# Patient Record
Sex: MALE | Race: WHITE | URBAN - METROPOLITAN AREA
[De-identification: names, ages, dates, MRNs, and addresses within clinical notes are randomized per-mention and may not be internally consistent; named-entity substitution may affect disease eponyms.]

---

## 2017-09-19 ENCOUNTER — HOSPITAL ENCOUNTER (INPATIENT)
Facility: CLINIC | Age: 32
LOS: 2 days | Discharge: HOME | DRG: 885 | End: 2017-09-22
Attending: FAMILY MEDICINE | Admitting: PSYCHIATRY & NEUROLOGY
Payer: COMMERCIAL

## 2017-09-19 DIAGNOSIS — F31.12 BIPOLAR 1 DISORDER, MANIC, MODERATE (H): ICD-10-CM

## 2017-09-19 DIAGNOSIS — R45.1 AGITATION REQUIRING SEDATION PROTOCOL: ICD-10-CM

## 2017-09-19 DIAGNOSIS — F30.9 BIPOLAR I DISORDER, SINGLE MANIC EPISODE (H): ICD-10-CM

## 2017-09-19 DIAGNOSIS — R45.1 AGITATED: ICD-10-CM

## 2017-09-19 DIAGNOSIS — F91.9 DISRUPTIVE BEHAVIOR DISORDER: ICD-10-CM

## 2017-09-19 DIAGNOSIS — F22 PARANOID STATE, SIMPLE (H): ICD-10-CM

## 2017-09-19 DIAGNOSIS — F41.1 GENERALIZED ANXIETY DISORDER: Primary | ICD-10-CM

## 2017-09-19 DIAGNOSIS — F30.10 MANIC BEHAVIOR (H): ICD-10-CM

## 2017-09-19 DIAGNOSIS — R46.89 AGGRESSIVE BEHAVIOR: ICD-10-CM

## 2017-09-19 DIAGNOSIS — F22 PARANOIA (H): ICD-10-CM

## 2017-09-19 DIAGNOSIS — Z72.0 TOBACCO ABUSE: ICD-10-CM

## 2017-09-19 LAB
ALCOHOL BREATH TEST: 0.03 (ref 0–0.01)
AMPHETAMINES UR QL SCN: NEGATIVE
BARBITURATES UR QL: NEGATIVE
BENZODIAZ UR QL: NEGATIVE
CANNABINOIDS UR QL SCN: POSITIVE
COCAINE UR QL: NEGATIVE
ETHANOL UR QL SCN: POSITIVE
OPIATES UR QL SCN: NEGATIVE

## 2017-09-19 PROCEDURE — 90791 PSYCH DIAGNOSTIC EVALUATION: CPT

## 2017-09-19 PROCEDURE — 96372 THER/PROPH/DIAG INJ SC/IM: CPT | Performed by: FAMILY MEDICINE

## 2017-09-19 PROCEDURE — 25000125 ZZHC RX 250: Performed by: FAMILY MEDICINE

## 2017-09-19 PROCEDURE — S0166 INJ OLANZAPINE 2.5MG: HCPCS | Performed by: FAMILY MEDICINE

## 2017-09-19 PROCEDURE — 82075 ASSAY OF BREATH ETHANOL: CPT | Performed by: FAMILY MEDICINE

## 2017-09-19 PROCEDURE — 80320 DRUG SCREEN QUANTALCOHOLS: CPT | Performed by: FAMILY MEDICINE

## 2017-09-19 PROCEDURE — 80307 DRUG TEST PRSMV CHEM ANLYZR: CPT | Performed by: FAMILY MEDICINE

## 2017-09-19 PROCEDURE — 99285 EMERGENCY DEPT VISIT HI MDM: CPT | Mod: Z6 | Performed by: FAMILY MEDICINE

## 2017-09-19 PROCEDURE — 99285 EMERGENCY DEPT VISIT HI MDM: CPT | Mod: 25 | Performed by: FAMILY MEDICINE

## 2017-09-19 RX ORDER — BUSPIRONE HYDROCHLORIDE 15 MG/1
TABLET ORAL
Status: ON HOLD | COMMUNITY
End: 2017-09-22

## 2017-09-19 RX ORDER — OLANZAPINE 10 MG/2ML
10 INJECTION, POWDER, FOR SOLUTION INTRAMUSCULAR
Status: COMPLETED | OUTPATIENT
Start: 2017-09-19 | End: 2017-09-19

## 2017-09-19 RX ADMIN — OLANZAPINE 10 MG: 10 INJECTION, POWDER, LYOPHILIZED, FOR SOLUTION INTRAMUSCULAR at 12:10

## 2017-09-19 ASSESSMENT — ENCOUNTER SYMPTOMS
FEVER: 0
SLEEP DISTURBANCE: 1
CONFUSION: 0
HALLUCINATIONS: 0
AGITATION: 1
ACTIVITY CHANGE: 1
ABDOMINAL PAIN: 0
DYSPHORIC MOOD: 0
SHORTNESS OF BREATH: 0
HYPERACTIVE: 1

## 2017-09-19 NOTE — ED NOTES
Bed: HW04  Expected date: 9/19/17  Expected time: 11:19 AM  Means of arrival: Ambulance  Comments:  N 712  31y M combative, hold, security to meet in [facility] [place]

## 2017-09-19 NOTE — ED NOTES
Police called numerous times . Recent dx of schizophrenia. Uncooperative. trashed his apartment.

## 2017-09-19 NOTE — PHARMACY-ADMISSION MEDICATION HISTORY
"Admission Medication History status for the 9/19/2017 admission is complete.  See EPIC admission navigator for Prior to Admission medications.    Medication history sources:  Patient, [facility] ([phone])     Medication history source reliability: Moderate. Patient knew he was on anxiety medication but did not know name. Medication was clarified with [facility] then again with patient to be buspirone.     Medication adherence:  Good. Patient reports \"this\" is the first time he is taking medications but he is able to take his buspirone daily.     Changes made to PTA medication list (reason)  Added:   -buspirone 15mg tab - per patient/pharmacy.  Deleted: None  Changed: None    Additional medication history information (including reliability of information, actions taken by pharmacist):   -Per [facility] pharmacy - patient picked up #45 tabs of buspirone on 9/12/17.   -Patient reports he takes 1 tablet of buspirone in the morning and 1/2 to 1 tablet in the evening depending on how he feels (prescription written for 1/2 tablet in the evening). Recently, patient took 1 tablet in the morning and 1 tablet in the evening.   -Patient previously picked up lorazepam which patient reported helped with anxiety but he ran out of pills.   Per [facility] pharmacy:  8/14/17 : Picked up lorazepam 0.5mg- 1T PO Q6H PRN for #14 tabs.  8/7/17: Picked up lorazepam 1mg - 1T PO QD PRN for #10 tabs.  7/27/17: Picked up lorazepam 1mg - 1T PO BID PRN for #12 tabs.  -Per [facility], Patient previously on sertraline 25mg - 1 tablet by mouth once daily picked up 7/27/17 for #30 tabs. Patient reported he stopped this medication because it made him very sick.  -Patient confirmed no known allergies.     Time spent in this activity: 20 minutes    Medication history completed by: MARCELLO Aranda3 Pharmacy Intern    Prior to Admission medications    Medication Sig Last Dose Taking? Auth Provider   busPIRone (BUSPAR) 15 MG tablet Take one tablet by mouth in the " morning and a half tablet by mouth in the evening. 9/19/2017 at AM Yes Unknown, Entered By History

## 2017-09-19 NOTE — ED NOTES
"Pt yelling, swearing at staff, making threats to PA and RN; \"You'll see what goes around comes around.\"    "

## 2017-09-19 NOTE — ED NOTES
Arrived in restraints accompanied by police. Patient is threatening staff,not cooperative. Not willing to follow directions. Patient had trashed and thrown things off his apt balcony. Swearing loudly refusing to cooperate.

## 2017-09-19 NOTE — ED NOTES
Alert but sleepy. Calm and cooperative  Verbalizes understanding of need to remain calm and cooperative when restraints removed,  agreeable with conditions for restraint removal.  restraints removed, given water, then sleeping calmly in bed

## 2017-09-19 NOTE — ED NOTES
"RN going through triage questions, pt yelling at RN, not answering all questions instead responding \"Are you?\"  Pt asking when he can leave and why he was given meds, pt states \"You should've just rolled me a blunt then I would've been good.  What do you think I'm gonna do, run out of here?\"  "

## 2017-09-19 NOTE — ED NOTES
Pt continues to struggle against restraints, complaining of pain in lower back and wrist-RN and MD notified.

## 2017-09-19 NOTE — ED PROVIDER NOTES
In person face to face assessment completed, including an evaluation of the patient's immediate reaction to the intervention, complete review of systems assessment, behavioral assessment and review/assessment of history, drugs and medications, recent labs, etc., and behavioral condition.  The patient experienced: No adverse physical outcome from seclusion/restraint initiation.  The intervention of restraint or seclusion needs to terminate.     Alec Montana MD  09/19/17 8449

## 2017-09-19 NOTE — ED PROVIDER NOTES
"  History     Chief Complaint   Patient presents with     Aggressive Behavior     brought in by police swearing yelling,out of control     HPI  Brodie Carnes is a 31 year old male with a medical history notable for schizophrenia and NURY who presents to the ED via EMS after being taken in by police due to throwing things from his apartment balcony, exposing himself, and making violent threats. He became upset today after he realized that he has spent over one hundred thousand dollars on rent over the past 8 years at his apartment, and they had \"never fixed anything.\" This prompted his aforementioned behavior. The patient's father was contacted, and the patient hasn't been sleeping much for the past 2 to 3 months per his report. He also has a family history of bipolar disorder, though as noted above, no diagnosis of such. The patient reports that he is jobless, as he was fired for not being able to get along with his coworkers, and his unemployment pay has run out recently. He reports that he was prescribed a new medication for NURY a couple weeks ago, but he cannot remember the name of the med nor the doctor who prescribed it. 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 He denies any SI or HI.  Other history noted per police they have been out to his place 6 times in last 2 days.  They do not feel he is safe.  Patient is been somewhat increasing paranoid thoughts is been taunting neighbors also.  Father agrees patient is currently not stable and is been off medications since July.    PAST MEDICAL HISTORY:   Past Medical History:   Diagnosis Date     Schizophrenia (H)        PAST SURGICAL HISTORY: No past surgical history on file.    FAMILY HISTORY: No family history on file.    SOCIAL HISTORY:   Social History   Substance Use Topics     Smoking status: Not on file     Smokeless tobacco: Not on file "     Alcohol use Not on file       Patient's Medications    No medications on file        Not on File      I have reviewed the Medications, Allergies, Past Medical and Surgical History, and Social History in the Epic system.    Review of Systems   Unable to perform ROS: Psychiatric disorder   Constitutional: Positive for activity change. Negative for fever.   Respiratory: Negative for shortness of breath.    Cardiovascular: Negative for chest pain.   Gastrointestinal: Negative for abdominal pain.   Psychiatric/Behavioral: Positive for agitation, behavioral problems and sleep disturbance (Not sleeping). Negative for confusion, dysphoric mood, hallucinations, self-injury and suicidal ideas. The patient is hyperactive.    All other systems reviewed and are negative.      Physical Exam      Physical Exam   Constitutional: He appears well-developed and well-nourished. He appears distressed.   Patient agitated here requiring a code 21 patient placed initially in 5 point restraints.   HENT:   Head: Normocephalic and atraumatic.   Eyes: EOM are normal. Pupils are equal, round, and reactive to light. No scleral icterus.   Neck: Normal range of motion. Neck supple.   Cardiovascular: Regular rhythm.    Pulmonary/Chest: No stridor. No respiratory distress.   Abdominal: There is no guarding.   Musculoskeletal: He exhibits no deformity.   Neurological: He is alert. He has normal reflexes.   Nonfocal   Skin: Skin is warm and dry. No rash noted. He is not diaphoretic. No erythema. No pallor.   Psychiatric:   Patient agitated here in the ER uncooperative.   Nursing note and vitals reviewed.      ED Course     ED Course     Procedures         Patient evaluated in ER.  As noted 5 point restraints were placed initially.  Patient given Zyprexa 10 mg IM also.  Patient did not have any adverse reactions to medication or restraints he was removed from restraints is been more cooperative and reassessed in the ER.    Patient states he wants  to go home he focuses on spending over $100,000 and rent over last 8 years and is nothing to show for it.     was here and interviewed the patient received more collateral information from police along with father were both concerned about patient's well-being I think at this point patient off medications seems somewhat manic.  Increasing agitated behavior etc. is concerning at this point feel patient needs to be hospitalized.  Patient will not be admitted voluntarily.  After discussing with police and father information given him being off medications place the patient on a 72 hr hold and plan to admit for further mental health evaluation.    Patient's breathalyzers 0.031 urine tox screen positive for THC and alcohol.    Critical Care time:  none             Labs Ordered and Resulted from Time of ED Arrival Up to the Time of Departure from the ED   DRUG ABUSE SCREEN 6 CHEM DEP URINE ([facility]) - Abnormal; Notable for the following:        Result Value    Cannabinoids Qual Urine Positive (*)     Ethanol Qual Urine Positive (*)     All other components within normal limits   ALCOHOL BREATH TEST POCT - Abnormal; Notable for the following:     Alcohol Breath Test 0.031 (*)     All other components within normal limits     Results for orders placed or performed during the hospital encounter of 09/19/17   Drug abuse screen 6 urine (tox)   Result Value Ref Range    Amphetamine Qual Urine Negative NEG^Negative    Barbiturates Qual Urine Negative NEG^Negative    Benzodiazepine Qual Urine Negative NEG^Negative    Cannabinoids Qual Urine Positive (A) NEG^Negative    Cocaine Qual Urine Negative NEG^Negative    Ethanol Qual Urine Positive (A) NEG^Negative    Opiates Qualitative Urine Negative NEG^Negative   Alcohol breath test POCT   Result Value Ref Range    Alcohol Breath Test 0.031 (A) 0.00 - 0.01            Assessments & Plan (with Medical Decision Making)  31-year-old male presents by ambulance increasing agitated  behavior.  Patient history of mental illness family history of bipolar disorder patient had been on medications for what was presumed to be schizophrenia but has been off since July of this year.  Per father history patient is not been sleeping more agitated increasing paranoid thoughts are noted.  Police report 6 times out the patient's apartment which she's been evicted from in the last 2 days.  Patient throwing things off the balcony is been taunting neighbors patient exposing himself to others increasing paranoia aggressive behavior patient presented to the ER requiring a code 21 with 5 point restraints.  Medication sedation with Zyprexa 10 mg IM ×1 without any adverse effect patient  cooperative removed from the restraints patient wanted to go home but after further assessment with the  here in the ER feel patient is currently unstable and emotionally volatile and will place  72 hour hold and admit to mental health unit for further evaluation.           I have reviewed the nursing notes.    I have reviewed the findings, diagnosis, plan and need for follow up with the patient.    New Prescriptions    No medications on file       Final diagnoses:   Manic behavior (H)   Agitation requiring sedation protocol   Aggressive behavior   Paranoia (H)   I, Henrik Keys, am serving as a trained medical scribe to document services personally performed by Alec Montana MD, based on the provider's statements to me.      Alec NOBLE MD, was physically present and have reviewed and verified the accuracy of this note documented by Henrik Keys.       9/19/2017   [facility] EMERGENCY DEPARTMENT      This note was created at least in part by the use of dragon voice dictation system. Inadvertent typographical errors may still exist.  Alec Montana MD.         Alec Montana MD  09/19/17 3460

## 2017-09-19 NOTE — IP AVS SNAPSHOT
[address] [address]    Phone:  [phone]                                       After Visit Summary   9/19/2017    Brodie Carnes    MRN: [id number]           After Visit Summary Signature Page     I have received my discharge instructions, and my questions have been answered. I have discussed any challenges I see with this plan with the nurse or doctor.    ..........................................................................................................................................  Patient/Patient Representative Signature      ..........................................................................................................................................  Patient Representative Print Name and Relationship to Patient    ..................................................               ................................................  Date                                            Time    ..........................................................................................................................................  Reviewed by Signature/Title    ...................................................              ..............................................  Date                                                            Time

## 2017-09-20 PROBLEM — F30.9 MANIA (H): Status: ACTIVE | Noted: 2017-09-20

## 2017-09-20 PROCEDURE — 12400001 ZZH R&B MH UMMC

## 2017-09-20 PROCEDURE — 25000132 ZZH RX MED GY IP 250 OP 250 PS 637: Performed by: PSYCHIATRY & NEUROLOGY

## 2017-09-20 RX ORDER — BUSPIRONE HYDROCHLORIDE 15 MG/1
15 TABLET ORAL 2 TIMES DAILY
Status: DISCONTINUED | OUTPATIENT
Start: 2017-09-20 | End: 2017-09-22 | Stop reason: HOSPADM

## 2017-09-20 RX ORDER — DIAZEPAM 5 MG
5-20 TABLET ORAL EVERY 30 MIN PRN
Status: DISCONTINUED | OUTPATIENT
Start: 2017-09-20 | End: 2017-09-22 | Stop reason: HOSPADM

## 2017-09-20 RX ORDER — HYDROXYZINE HYDROCHLORIDE 25 MG/1
25-50 TABLET, FILM COATED ORAL EVERY 4 HOURS PRN
Status: DISCONTINUED | OUTPATIENT
Start: 2017-09-20 | End: 2017-09-22 | Stop reason: HOSPADM

## 2017-09-20 RX ORDER — FOLIC ACID 1 MG/1
1 TABLET ORAL DAILY
Status: DISCONTINUED | OUTPATIENT
Start: 2017-09-20 | End: 2017-09-22 | Stop reason: HOSPADM

## 2017-09-20 RX ORDER — ACETAMINOPHEN 325 MG/1
650 TABLET ORAL EVERY 4 HOURS PRN
Status: DISCONTINUED | OUTPATIENT
Start: 2017-09-20 | End: 2017-09-22 | Stop reason: HOSPADM

## 2017-09-20 RX ORDER — OLANZAPINE 10 MG/2ML
10 INJECTION, POWDER, FOR SOLUTION INTRAMUSCULAR
Status: DISCONTINUED | OUTPATIENT
Start: 2017-09-20 | End: 2017-09-22 | Stop reason: HOSPADM

## 2017-09-20 RX ORDER — BISACODYL 10 MG
10 SUPPOSITORY, RECTAL RECTAL DAILY PRN
Status: DISCONTINUED | OUTPATIENT
Start: 2017-09-20 | End: 2017-09-22 | Stop reason: HOSPADM

## 2017-09-20 RX ORDER — TRAZODONE HYDROCHLORIDE 50 MG/1
50 TABLET, FILM COATED ORAL
Status: DISCONTINUED | OUTPATIENT
Start: 2017-09-20 | End: 2017-09-22 | Stop reason: HOSPADM

## 2017-09-20 RX ORDER — MULTIPLE VITAMINS W/ MINERALS TAB 9MG-400MCG
1 TAB ORAL DAILY
Status: DISCONTINUED | OUTPATIENT
Start: 2017-09-20 | End: 2017-09-22 | Stop reason: HOSPADM

## 2017-09-20 RX ORDER — LANOLIN ALCOHOL/MO/W.PET/CERES
100 CREAM (GRAM) TOPICAL DAILY
Status: COMPLETED | OUTPATIENT
Start: 2017-09-20 | End: 2017-09-22

## 2017-09-20 RX ORDER — OLANZAPINE 10 MG/1
10 TABLET ORAL
Status: DISCONTINUED | OUTPATIENT
Start: 2017-09-20 | End: 2017-09-22 | Stop reason: HOSPADM

## 2017-09-20 RX ORDER — ALUMINA, MAGNESIA, AND SIMETHICONE 2400; 2400; 240 MG/30ML; MG/30ML; MG/30ML
30 SUSPENSION ORAL EVERY 4 HOURS PRN
Status: DISCONTINUED | OUTPATIENT
Start: 2017-09-20 | End: 2017-09-22 | Stop reason: HOSPADM

## 2017-09-20 RX ADMIN — FOLIC ACID 1 MG: 1 TABLET ORAL at 20:50

## 2017-09-20 RX ADMIN — MULTIPLE VITAMINS W/ MINERALS TAB 1 TABLET: TAB at 20:50

## 2017-09-20 RX ADMIN — BUSPIRONE HYDROCHLORIDE 15 MG: 15 TABLET ORAL at 20:50

## 2017-09-20 RX ADMIN — Medication 100 MG: at 20:50

## 2017-09-20 RX ADMIN — NICOTINE POLACRILEX 8 MG: 4 GUM, CHEWING ORAL at 21:33

## 2017-09-20 RX ADMIN — OLANZAPINE 10 MG: 10 TABLET, FILM COATED ORAL at 21:33

## 2017-09-20 RX ADMIN — TRAZODONE HYDROCHLORIDE 50 MG: 50 TABLET ORAL at 22:33

## 2017-09-20 ASSESSMENT — ACTIVITIES OF DAILY LIVING (ADL)
SWALLOWING: 0-->SWALLOWS FOODS/LIQUIDS WITHOUT DIFFICULTY
TOILETING: 0-->INDEPENDENT
PRIOR_FUNCTIONAL_LEVEL_COMMENT: OK
TRANSFERRING: 0-->INDEPENDENT
BATHING: 0-->INDEPENDENT
RETIRED_COMMUNICATION: 0-->UNDERSTANDS/COMMUNICATES WITHOUT DIFFICULTY
RETIRED_EATING: 0-->INDEPENDENT
AMBULATION: 0-->INDEPENDENT
FALL_HISTORY_WITHIN_LAST_SIX_MONTHS: NO
COGNITION: 0 - NO COGNITION ISSUES REPORTED
DRESS: 0-->INDEPENDENT

## 2017-09-20 NOTE — ED PROVIDER NOTES
Addendum:  Brodie Carnes is a 31 year old male who presents today for aggressive behavior.  Patient was planned for admission and signed out.  Overnight patient had no issues or escalations.  We'll continue with plan to admit to mental health as previously.     Janak Mensah MD  09/20/17 8922

## 2017-09-21 PROBLEM — F60.81 NARCISSISTIC PERSONALITY DISORDER (H): Status: ACTIVE | Noted: 2017-09-21

## 2017-09-21 PROBLEM — F31.12 BIPOLAR 1 DISORDER, MANIC, MODERATE (H): Status: ACTIVE | Noted: 2017-09-21

## 2017-09-21 LAB
ALBUMIN SERPL-MCNC: 4.1 G/DL (ref 3.4–5)
ALP SERPL-CCNC: 71 U/L (ref 40–150)
ALT SERPL W P-5'-P-CCNC: 30 U/L (ref 0–70)
ANION GAP SERPL CALCULATED.3IONS-SCNC: 11 MMOL/L (ref 3–14)
AST SERPL W P-5'-P-CCNC: 15 U/L (ref 0–45)
BASOPHILS # BLD AUTO: 0 10E9/L (ref 0–0.2)
BASOPHILS NFR BLD AUTO: 0.3 %
BILIRUB SERPL-MCNC: 0.6 MG/DL (ref 0.2–1.3)
BUN SERPL-MCNC: 11 MG/DL (ref 7–30)
CALCIUM SERPL-MCNC: 9.4 MG/DL (ref 8.5–10.1)
CHLORIDE SERPL-SCNC: 104 MMOL/L (ref 94–109)
CHOLEST SERPL-MCNC: 239 MG/DL
CO2 SERPL-SCNC: 25 MMOL/L (ref 20–32)
CREAT SERPL-MCNC: 0.97 MG/DL (ref 0.66–1.25)
DEPRECATED CALCIDIOL+CALCIFEROL SERPL-MC: 35 UG/L (ref 20–75)
DIFFERENTIAL METHOD BLD: NORMAL
EOSINOPHIL # BLD AUTO: 0.7 10E9/L (ref 0–0.7)
EOSINOPHIL NFR BLD AUTO: 8.8 %
ERYTHROCYTE [DISTWIDTH] IN BLOOD BY AUTOMATED COUNT: 12.4 % (ref 10–15)
GFR SERPL CREATININE-BSD FRML MDRD: 89 ML/MIN/1.7M2
GLUCOSE SERPL-MCNC: 114 MG/DL (ref 70–99)
HCT VFR BLD AUTO: 50.1 % (ref 40–53)
HDLC SERPL-MCNC: 88 MG/DL
HGB BLD-MCNC: 17.7 G/DL (ref 13.3–17.7)
IMM GRANULOCYTES # BLD: 0 10E9/L (ref 0–0.4)
IMM GRANULOCYTES NFR BLD: 0.3 %
LDLC SERPL CALC-MCNC: 127 MG/DL
LYMPHOCYTES # BLD AUTO: 2 10E9/L (ref 0.8–5.3)
LYMPHOCYTES NFR BLD AUTO: 24.9 %
MCH RBC QN AUTO: 33 PG (ref 26.5–33)
MCHC RBC AUTO-ENTMCNC: 35.3 G/DL (ref 31.5–36.5)
MCV RBC AUTO: 94 FL (ref 78–100)
MONOCYTES # BLD AUTO: 0.6 10E9/L (ref 0–1.3)
MONOCYTES NFR BLD AUTO: 7.5 %
NEUTROPHILS # BLD AUTO: 4.6 10E9/L (ref 1.6–8.3)
NEUTROPHILS NFR BLD AUTO: 58.2 %
NONHDLC SERPL-MCNC: 151 MG/DL
NRBC # BLD AUTO: 0 10*3/UL
NRBC BLD AUTO-RTO: 0 /100
PLATELET # BLD AUTO: 218 10E9/L (ref 150–450)
POTASSIUM SERPL-SCNC: 3.7 MMOL/L (ref 3.4–5.3)
PROT SERPL-MCNC: 8.1 G/DL (ref 6.8–8.8)
RBC # BLD AUTO: 5.36 10E12/L (ref 4.4–5.9)
SODIUM SERPL-SCNC: 140 MMOL/L (ref 133–144)
TRIGL SERPL-MCNC: 122 MG/DL
TSH SERPL DL<=0.005 MIU/L-ACNC: 1.88 MU/L (ref 0.4–4)
WBC # BLD AUTO: 8 10E9/L (ref 4–11)

## 2017-09-21 PROCEDURE — 36415 COLL VENOUS BLD VENIPUNCTURE: CPT | Performed by: PSYCHIATRY & NEUROLOGY

## 2017-09-21 PROCEDURE — 80053 COMPREHEN METABOLIC PANEL: CPT | Performed by: PSYCHIATRY & NEUROLOGY

## 2017-09-21 PROCEDURE — 97150 GROUP THERAPEUTIC PROCEDURES: CPT | Mod: GO

## 2017-09-21 PROCEDURE — 25000132 ZZH RX MED GY IP 250 OP 250 PS 637: Performed by: PSYCHIATRY & NEUROLOGY

## 2017-09-21 PROCEDURE — 80061 LIPID PANEL: CPT | Performed by: PSYCHIATRY & NEUROLOGY

## 2017-09-21 PROCEDURE — 99223 1ST HOSP IP/OBS HIGH 75: CPT | Mod: AI | Performed by: PSYCHIATRY & NEUROLOGY

## 2017-09-21 PROCEDURE — 90853 GROUP PSYCHOTHERAPY: CPT

## 2017-09-21 PROCEDURE — 12400001 ZZH R&B MH UMMC

## 2017-09-21 PROCEDURE — 85025 COMPLETE CBC W/AUTO DIFF WBC: CPT | Performed by: PSYCHIATRY & NEUROLOGY

## 2017-09-21 PROCEDURE — 82306 VITAMIN D 25 HYDROXY: CPT | Performed by: PSYCHIATRY & NEUROLOGY

## 2017-09-21 PROCEDURE — 84443 ASSAY THYROID STIM HORMONE: CPT | Performed by: PSYCHIATRY & NEUROLOGY

## 2017-09-21 RX ORDER — OLANZAPINE 10 MG/1
10 TABLET ORAL AT BEDTIME
Status: DISCONTINUED | OUTPATIENT
Start: 2017-09-21 | End: 2017-09-22 | Stop reason: HOSPADM

## 2017-09-21 RX ADMIN — HYDROXYZINE HYDROCHLORIDE 50 MG: 25 TABLET ORAL at 02:15

## 2017-09-21 RX ADMIN — NICOTINE POLACRILEX 8 MG: 4 GUM, CHEWING ORAL at 09:34

## 2017-09-21 RX ADMIN — Medication 100 MG: at 08:36

## 2017-09-21 RX ADMIN — MULTIPLE VITAMINS W/ MINERALS TAB 1 TABLET: TAB at 08:36

## 2017-09-21 RX ADMIN — NICOTINE POLACRILEX 4 MG: 4 GUM, CHEWING ORAL at 19:44

## 2017-09-21 RX ADMIN — NICOTINE POLACRILEX 8 MG: 4 GUM, CHEWING ORAL at 12:21

## 2017-09-21 RX ADMIN — NICOTINE POLACRILEX 8 MG: 4 GUM, CHEWING ORAL at 20:54

## 2017-09-21 RX ADMIN — HYDROXYZINE HYDROCHLORIDE 25 MG: 25 TABLET ORAL at 09:33

## 2017-09-21 RX ADMIN — BUSPIRONE HYDROCHLORIDE 15 MG: 15 TABLET ORAL at 08:36

## 2017-09-21 RX ADMIN — FOLIC ACID 1 MG: 1 TABLET ORAL at 08:36

## 2017-09-21 RX ADMIN — NICOTINE POLACRILEX 8 MG: 4 GUM, CHEWING ORAL at 14:15

## 2017-09-21 RX ADMIN — NICOTINE POLACRILEX 8 MG: 4 GUM, CHEWING ORAL at 17:59

## 2017-09-21 RX ADMIN — OLANZAPINE 10 MG: 10 TABLET, FILM COATED ORAL at 20:54

## 2017-09-21 RX ADMIN — BUSPIRONE HYDROCHLORIDE 15 MG: 15 TABLET ORAL at 15:45

## 2017-09-21 RX ADMIN — NICOTINE POLACRILEX 8 MG: 4 GUM, CHEWING ORAL at 08:36

## 2017-09-21 ASSESSMENT — ACTIVITIES OF DAILY LIVING (ADL)
DRESS: STREET CLOTHES
GROOMING: INDEPENDENT
ORAL_HYGIENE: INDEPENDENT
LAUNDRY: UNABLE TO COMPLETE
GROOMING: INDEPENDENT
LAUNDRY: WITH SUPERVISION
ORAL_HYGIENE: INDEPENDENT
DRESS: INDEPENDENT

## 2017-09-21 NOTE — PROGRESS NOTES
Writer called and made the following PCP aftercare appointment:     [facility]   [address]  [address]  [place] [address]  Phone: [phone]  Fax: [phone] HUC TO FAX DISCHARGE INSTRUCTIONS   *You have a primary care provider appointment scheduled with Vandana Zacarias PA-C for Tuesday, October 3rd at 10:00 am.

## 2017-09-21 NOTE — H&P
"[facility], [place]   Psychiatric History & Physical  Admission date: 9/19/2017        Chief Complaint:   \"I got everything out of my system, and I feel better now\"         HPI:     Brodie is a 31 year old male with probable past history of Bipolar Disorder, and NURY who was brought in by police in restraints for exhibiting dysregulated manic-like behavior in the community. He mentions that he has \"lost\" $100K in the past 8 years due to paying rent, and incurring the cost of fixing various problems in his apartment. He mentions that he still has his apt, and has plans to get a job to pay it. When asked about his angry/agressive at the apt, he mentions that he was \"trying to let off steam,\" and felt like the anger was a means to purge his stress from his system, \"I've had a lot on my mind,\" he adds. He mentions he's been worried about a new tenant at his building, whom the patient feels is trying to sabotage him by \"getting me in trouble.\" He denied any paranoid thoughts about his tenet, or any homicidal ideation/intent/plan. He mentions that he lost his job (after I brought up the subject), and mentions that they fired him due to \"me being too good at my job,\" and followed by describing how he thinks he was fired because they found that he opened a bank account without a social security number. 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 When asked about his job, stressors, and life during the interview he interrupts and mentions that \"I can get any job I want,\" and \"I can get any girl I want,\" and that \"I paid off my car all on my own.\"     Regarding his past, he mentions that the only legal trouble he's gotten into is DWI in 2011. He denies any past hospitalization. He denies any depressive symptoms, denies any elevated moods/racing thoughts/impulsivity. He denies being a violent person, but does get angry at times but never physical. He adds that " "nothing is wrong with him, and mentions that he only has anxiety, and chivo by playing basketball and smoking weed occasionally. Denies AH/VH, or ever having suicidal or self injurious thoughts. He is willing to take medications and follow up with his outpatient provider.     Collateral: Collateral information was obtained from his father, Jaskaran. He mentions that Brodie's claim of loosing $100K is false, and \"all in his head,\" and adds that he was paying Brodie's rent for couple of months. He mentions that Brodie was kicked out of his apartment for the complaints and for inability to pay rent. Brodie is currently living at home with his parents. He mentions that Brodie , when stable, is not a violent person, but does tend to raise his voice and become verbally aggressive. Apart from his recent agitation/aggressive behavior, he feels safe to have Brodie come home post hospitalization, if he remains stable.           Past Psychiatric History:   Past inpatient treatment: No documented history was found, although his father mentioned that Brodie was hospitalized 3 years ago for similar issues in [place].   Medications: Documented scripts of Ativan (last filled on 8/2017) and Buspar for anxiety.   Current outpatient psychiatrist: None  Current outpatient therapist: None  Other (ACT team etc): None  Past suicide attempts: None  History of commitments: None  History of ECT: None         Substance Use and History:   Tobacco: Smokes one pack a month  Alcohol: \"socially\" drinks couple drinks of beer occasionally on weekends.   Marijuana: Smokes cannabis multiple times a week for anxiety relief.   Cocaine: Denies   Amphetamines: Denies   Opioids: Denies   Other drug usage: Denies   IV Drug history: Denies     Detox history: Denies     CD treatment history: Denies         Past Medical History:   PAST MEDICAL HISTORY:   Past Medical History:   Diagnosis Date     Schizophrenia (H)        PAST SURGICAL HISTORY: No past surgical history " on file.   Denies any TBI, LOC, or seizures.           Family History:   FAMILY HISTORY: Mother has bipolar disorder (taking Risperdal)         Social History:   SOCIAL HISTORY:   Social History   Substance Use Topics     Smoking status: Not on file     Smokeless tobacco: Not on file     Alcohol use Not on file            Physical ROS:   The patient's 10-point review of systems was negative except as noted in HPI.         PTA Medications:     Prescriptions Prior to Admission   Medication Sig Dispense Refill Last Dose     busPIRone (BUSPAR) 15 MG tablet Take one tablet by mouth in the morning and a half tablet by mouth in the evening.   9/19/2017 at AM          Allergies:   No Known Allergies       Labs:     Recent Results (from the past 48 hour(s))   Drug abuse screen 6 urine (tox)    Collection Time: 09/19/17  2:19 PM   Result Value Ref Range    Amphetamine Qual Urine Negative NEG^Negative    Barbiturates Qual Urine Negative NEG^Negative    Benzodiazepine Qual Urine Negative NEG^Negative    Cannabinoids Qual Urine Positive (A) NEG^Negative    Cocaine Qual Urine Negative NEG^Negative    Ethanol Qual Urine Positive (A) NEG^Negative    Opiates Qualitative Urine Negative NEG^Negative   Alcohol breath test POCT    Collection Time: 09/19/17  3:43 PM   Result Value Ref Range    Alcohol Breath Test 0.031 (A) 0.00 - 0.01   CBC with platelets differential    Collection Time: 09/21/17  9:32 AM   Result Value Ref Range    WBC 8.0 4.0 - 11.0 10e9/L    RBC Count 5.36 4.4 - 5.9 10e12/L    Hemoglobin 17.7 13.3 - 17.7 g/dL    Hematocrit 50.1 40.0 - 53.0 %    MCV 94 78 - 100 fl    MCH 33.0 26.5 - 33.0 pg    MCHC 35.3 31.5 - 36.5 g/dL    RDW 12.4 10.0 - 15.0 %    Platelet Count 218 150 - 450 10e9/L    Diff Method Automated Method     % Neutrophils 58.2 %    % Lymphocytes 24.9 %    % Monocytes 7.5 %    % Eosinophils 8.8 %    % Basophils 0.3 %    % Immature Granulocytes 0.3 %    Nucleated RBCs 0 0 /100    Absolute Neutrophil 4.6 1.6 -  "8.3 10e9/L    Absolute Lymphocytes 2.0 0.8 - 5.3 10e9/L    Absolute Monocytes 0.6 0.0 - 1.3 10e9/L    Absolute Eosinophils 0.7 0.0 - 0.7 10e9/L    Absolute Basophils 0.0 0.0 - 0.2 10e9/L    Abs Immature Granulocytes 0.0 0 - 0.4 10e9/L    Absolute Nucleated RBC 0.0    Comprehensive metabolic panel    Collection Time: 09/21/17  9:32 AM   Result Value Ref Range    Sodium 140 133 - 144 mmol/L    Potassium 3.7 3.4 - 5.3 mmol/L    Chloride 104 94 - 109 mmol/L    Carbon Dioxide 25 20 - 32 mmol/L    Anion Gap 11 3 - 14 mmol/L    Glucose 114 (H) 70 - 99 mg/dL    Urea Nitrogen 11 7 - 30 mg/dL    Creatinine 0.97 0.66 - 1.25 mg/dL    GFR Estimate 89 >60 mL/min/1.7m2    GFR Estimate If Black >90 >60 mL/min/1.7m2    Calcium 9.4 8.5 - 10.1 mg/dL    Bilirubin Total 0.6 0.2 - 1.3 mg/dL    Albumin 4.1 3.4 - 5.0 g/dL    Protein Total 8.1 6.8 - 8.8 g/dL    Alkaline Phosphatase 71 40 - 150 U/L    ALT 30 0 - 70 U/L    AST 15 0 - 45 U/L   TSH with free T4 reflex and/or T3 as indicated    Collection Time: 09/21/17  9:32 AM   Result Value Ref Range    TSH 1.88 0.40 - 4.00 mU/L   Lipid panel    Collection Time: 09/21/17  9:32 AM   Result Value Ref Range    Cholesterol 239 (H) <200 mg/dL    Triglycerides 122 <150 mg/dL    HDL Cholesterol 88 >39 mg/dL    LDL Cholesterol Calculated 127 (H) <100 mg/dL    Non HDL Cholesterol 151 (H) <130 mg/dL          Physical and Psychiatric Examination:     BP 94/50  Pulse 54  Temp 97.9  F (36.6  C) (Oral)  Resp 16  Ht 1.753 m (5' 9\")  Wt 60.1 kg (132 lb 8 oz)  SpO2 100%  BMI 19.57 kg/m2  Weight is 132 lbs 8 oz  Body mass index is 19.57 kg/(m^2).    Physical Exam:  I have reviewed the physical exam as documented by Alec Montana MD on 9/19/2017 and agree with findings and assessment and have no additional findings to add at this time.    Mental Status Exam:  Appearance: White male, slim body habitus, shaved head. Moderate grooming/hygiene. No acute distress.   Attitude:  Cooperative, calm, however an " "undercurrent of adamant, demanding behavior.   Eye Contact:  Continuous   Mood:  \"I'm fine,nothing is wrong with me\"   Affect:  Euthymic   Speech:  Regular in tone, somewhat fast in rate   Language: fluent and intact in English  Psychomotor, Gait, Musculoskeletal:  Unremarkable for tremors, rigidity, however some noticeable restless behavior.   Throught Process:  Often tangential, circumferential.   Associations:  Some intermittent loosening of associations.   Thought Content:  Possible delusions about situations at home involving rent/housing, etc. No AH/VH, or suicidal/homicidal ideation, intent, or plan.   Insight:  Poor  Judgement:  Fair   Oriented to:  Person, place, time.   Attention Span and Concentration:  Adequate   Recent and Remote Memory:  Unclear, could have some deficits.   Fund of Knowledge:  Average          Admission Diagnoses:    Bipolar Disorder vs. Substance Induced Mood Disorder  Traits of Narcissistic Personality Disorder   Traits of Antisocial Personality Disorder   Alcohol Use Disorder   Cannabis Use Disorder   R/o  Impulse Control Disorder          Assessment & Plan:     Assessment:  Brodie is a 31 year old male with likely Bipolar disorder, along with strong characterological components of Narcissism and Antisocial Personality Disorder who was admitted on a 72 hour hold for exhibiting agitated/aggressive behavior in the community and in the ED. He was initially in restraints, and then received IM Zyprexa to calm his mood, which he seemed to benefit from. In the inpatient unit today, he is wholly calm, cooperative, attending groups, and compliant to take medications (scheduled for tonight). His interaction today during the interview, along with collateral from his father points to a possibility of elevated mood from Bipolar disorder, with delusions and paranoia present. His behavior waxes and wanes over the course of a day, and is quite reactive to stressors, which could point more towards a " characterological/impulse control issue rather than an episode of lovely. Substances such as ETOH, and Cannabis (along with any undetectable drugs) could also be precipitating factors to his behavioral dysregulation. A important biological precipitating factor is his family history of Bipolar disorder in his mother.     Of note, he presents with a cold, calculated tone in his interaction, and could be assumed to be manipulative behavior (characterologic of Narcissism and Antisocial) which could also lead to dysfunction in life, particularly with interpersonal problems,occupational problems, and social problems. Longitudinally, he seems like he will have recurrent problems with his delusions, anger, and personality components, however at this time, he is not an imminent danger to self/others, and is able to care for basic needs of self. He is living with his parents (since he was kicked out his his apt), and his father feels safe with having him come home if he is back to his calmer baseline attitude. I will continue the 72 hour hold for purposes of having him remain in the hospital for further stabilization. Due to the above mentioned factors, we will plan to discharge him tomorrow.      I will scheduled Zyprexa 10 mg HS for helping him attenuate( and prevent) toxic elevation of his mood, with obtaining better sleep, along with slowing down some of his delusions and anxious thought processes. We discussed the importance of follow up with his PCP, along with getting a referral to outpatient psychiatrist and therapy. It appears he has already made an appointment with a primary care provider at [facility] with the help of .     Plan:  - Start Zyprexa 10 mg HS  - Continue Buspar 15 mg BID      Legal:  72 hour hold (expires 9/22/2017 at 4:20 PM)     Disposition:  Likely back to (parent's) home      Report by:  Darian Colorado MD    Spent 55   minutes on encounter, >50% of which was  spent in counseling and/or coordination of care, consisting of taking history, reviewing symptoms, obtaining collateral from patient's family, discussing medications, side effects and treatment expectations.

## 2017-09-21 NOTE — PROGRESS NOTES
"Attended 2.5 of 3 OT groups offered. Hyper-verbal with grandiosity. Needed cuing regarding monopolization of group discussion. Accepted feedback and cuing.  Denied need to be here and stated his goal was to \"Help others.\" Distractible and needed reminders to respect others when they were speaking. Pt was given and completed a written self assessment. OT purpose was explained with a value of having involvement in tx plan, and provided options to meet self identified goals. Will assess further in the areas of organization, problem solving, and concentration. Was able to make decisions when given 3 options and was able to learn new cognitive task with ease.     "

## 2017-09-21 NOTE — PROGRESS NOTES
"Pt was visible in the milieu during this shift, and was social with peers an staff. Pt denies SI/SIB, rates depression 1/10, and rates anxiety 2-5/10 today. Pt reports, \"littel triggers make my anxiety worse, when I don't know what's happening around me.\" Pt hopes to discharge tomorrow, and has no other concerns to report at this time.       09/21/17 1822   Behavioral Health   Hallucinations denies / not responding to hallucinations   Thinking distractable   Orientation person: oriented;place: oriented;time: oriented   Memory baseline memory   Insight poor   Judgement impaired   Eye Contact at examiner   Affect full range affect   Mood mood is calm   Physical Appearance/Attire appears stated age   Hygiene well groomed   Suicidality other (see comments)  (Denies)   Self Injury other (see comment)  (Denies)   Elopement (None observed)   Activity other (see comment)  (Visible in milieu, social with peers and staff)   Speech clear;coherent;pressured;rambling   Medication Sensitivity no stated side effects   Psychomotor / Gait balanced;steady   Safety   Elopement status 15   Psycho Education   Type of Intervention 1:1 intervention   Response participates, initiates socially appropriate   Hours 0.5   Treatment Detail (Feedback, MH Check-in)   Activities of Daily Living   Hygiene/Grooming independent   Oral Hygiene independent   Dress street clothes   Laundry with supervision   Room Organization independent     "

## 2017-09-21 NOTE — PROGRESS NOTES
09/21/17 1457   Behavioral Health   Hallucinations denies / not responding to hallucinations   Thinking poor concentration   Orientation person: oriented;date: oriented;place: oriented;time: oriented   Memory confabulation   Insight insight appropriate to events;insight appropriate to situation   Judgement intact   Eye Contact at examiner   Affect full range affect   Mood mood is calm   Physical Appearance/Attire attire appropriate to age and situation;appears stated age   Hygiene well groomed   Suicidality (denies)   Self Injury other (see comment)  (denies)   Elopement (no value)   Activity restless   Medication Sensitivity no observed side effects   Psychomotor / Gait balanced;steady   Activities of Daily Living   Hygiene/Grooming independent   Oral Hygiene independent   Dress independent   Laundry unable to complete   Room Organization independent     Patient is well visible around the milieu and participating in groups. Pt seems restless but yet balanced and steady. He says he feels much better than the day he got admitted to the hospital. He had a good day and seems happy about leaving tomorrow.

## 2017-09-21 NOTE — PLAN OF CARE
Problem: General Plan of Care (Inpatient Behavioral)  Goal: Team Discussion  Team Plan:  BEHAVIORAL TEAM DISCUSSION     Participants: Dr. Darian Cloorado MD, Stacia Holliday RN, and Rakel Pinon [facility]   Progress: Initial team discussion   Continued Stay Criteria/Rationale: Pt was admitted on a 72 hour hold (expires 9/22/17 at 16:20 pm) due to aggressive behaviors.   Medical/Physical: See medical consult   Precautions:   Behavioral Orders   Procedures     Assault precautions     Code 1 - Restrict to Unit     Routine Programming       As clinically indicated     Sexual precautions     Status 15       Every 15 minutes.     Withdrawal precautions     Plan:  The plan is to assess the patient for mental health and medication needs.  The patient will be prescribed medications to treat the identified symptoms.  Upon discharge the patient will be referred to services as appropriate based on the assessment.  Rationale for change in precautions or plan: No change, initial plan.

## 2017-09-21 NOTE — PROGRESS NOTES
09/20/17 2025   Patient Belongings   Patient Belongings shoes;clothing   Disposition of Belongings In pt locker, with pt   Belongings Search Yes       ITEMS IN PT LOCKER    Tennis shoes w/laces    ITEMS WITH PT  T-shirt  Shorts    A               Admission:  I am responsible for any personal items that are not sent to the safe or pharmacy.  [facility] is not responsible for loss, theft or damage of any property in my possession.    Signature:  _________________________________ Date: _______  Time: _____                                              Staff Signature:  ____________________________ Date: ________  Time: _____      2nd Staff person, if patient is unable/unwilling to sign:    Signature: ________________________________ Date: ________  Time: _____     Discharge:  [facility] has returned all of my personal belongings:    Signature: _________________________________ Date: ________  Time: _____                                          Staff Signature:  ____________________________ Date: ________  Time: _____

## 2017-09-21 NOTE — PROGRESS NOTES
"Initial Psychosocial Assessment    I have reviewed the chart, met with the patient, and developed Care Plan.  Information for assessment was obtained from:     Limited chart review and interview with Pt.     Presenting Problem:  Pt is a 31 year old male who came into [facility] ED for agitation. During Pt's time in the ED he was aggressive, hostile, and paranoid. Pt was yelling and threatening hospital staff. Pt demonstrated symptoms of lovely, such as hyper verbal, pressured speech, perseverating, and paranoid. Pt figured out in the past eight years he has spent over $100,000 on rent, and was extremely upset.    Writer met with Pt who was cooperative and agreeable during assessment. Pt's speech was slightly pressured. Pt appeared anxious and tense during assessment, but was respectful and provided information. Pt was agreeable and signed an PRISCILLA for Writer to make a follow up appointment with primary care physician. Writer discussed other therapeutic treatment options, to which Pt replied. \"my therapy is the courts, basketball courts\".     History of Mental Health and Chemical Dependency:  Per DEC, this is Pt's first mental health hospitalization. Pt's father reported his son has a diagnosis of bipolar, and there is a history of bipolar illness in their family.     Pt endorses smoking marijuana and drinking. Pt's utox was positive for cannabinoids, ethanol, and breathalyzer was .31.      Family Description (Constellation, Family Psychiatric History):  Pt was born in the [facility], and moved to [place] at age 5. Pt was raised by both parents and grandparents. Pt is an only child.   History of bipolar-mother.     Significant Life Events (Illness, Abuse, Trauma, Death):  Denies     Living Situation:  Pt lives in an apartment alone, he recently received an eviction notice due to sexual and aggressive behaviors exhibited here. Pt has lived in this apartment about 8 years.     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     Occupational History:  Unemployed, recently lost job due to not getting along with coworkers.     Financial Status:   Looking for a job      Legal Issues:  DUI in 2012    Ethnic/Cultural Issues:  None     Spiritual Orientation:  Denies      Service History:  None     Social Functioning (organization, interests):  Interests: business, stock market, talking with people, friends, family, basketball, educate self.   Supports: father, Jaskaran     Current Treatment Providers are:  PCP: Vandana Zacarias with [facility]        Social Service Assessment/Plan:  Patient has been admitted for psychiatric stabilization due to aggressive behaviors. Patient will have psychiatric assessment and medication management by the psychiatrist. Medications will be reviewed and adjusted per MD as indicated. The treatment team will continue to assess and stabilize the patient's mental health symptoms with the use of medications and therapeutic programming. Hospital staff will provide a safe environment and a therapeutic milieu. Staff will continue to assess patient as needed. Patient will participate in unit groups and activities. Patient will receive individual and group support on the unit.  CTC will do individual inpatient treatment planning and after care planning. CTC will discuss options for increasing community supports with the patient. CTC will coordinate with outpatient providers and will place referrals to ensure appropriate follow up care is in place.

## 2017-09-21 NOTE — PLAN OF CARE
Problem: General Plan of Care (Inpatient Behavioral)  Goal: Individualization/Patient Specific Goal (IP Behavioral)  The patient and/or their representative will achieve their patient-specific goals related to the plan of care.    The patient-specific goals include:   Personal Plan of Care     Reasons you are in the Hospital  1. Getting relief for 100 k from apartment   2.  3.  4.     Goals for Discharge   1. Stay positive   2.  3.

## 2017-09-21 NOTE — PLAN OF CARE
"Problem: Manic Symptoms  Goal: Manic Symptoms  Signs and symptoms of listed problems will be absent or manageable.  Outcome: Declining  ADMIT: This is the first admit for this 30 yo male who was brought in by an ambulance after the neighbors called the police on him. Pt had been throwing objects off his balcony and exposing himself to the neighbors. Pt has a history of bipolar but when this RN discussed this with him he stated he didn't know what bipolar meant. Pt stated he was only here because he \"lost $100,000 dollars\" but didn't elaborate as to how he lost this money. Pt does have paranoid thoughts about the people around him and even questioned as to why he is here. Pt kept asking this RN to allow him to smoke. Pt states his 72 hr hold is up on Thursday and will be ready to leave. Pt is disorganized and grandiose in his thinking but has settled well on the unit at this time. Pt is only on buspar for his anxiety and states this medication works well for him. Pt calm at this moment.  Around 2200 pt came up to the med Remerge x2 for this RN and stated he wasn't tired but had \"alot of anxiety\". This RN gave pt zyprexa 10mg po and he went to his room. At 2230 pt came back and stated he doesn't feel a thing. This RN gave pt trazadone 50 mg po and he returned to his room. Will cont to assess.       "

## 2017-09-22 VITALS
WEIGHT: 132.5 LBS | HEIGHT: 69 IN | TEMPERATURE: 96.4 F | RESPIRATION RATE: 16 BRPM | DIASTOLIC BLOOD PRESSURE: 85 MMHG | BODY MASS INDEX: 19.62 KG/M2 | OXYGEN SATURATION: 100 % | HEART RATE: 78 BPM | SYSTOLIC BLOOD PRESSURE: 131 MMHG

## 2017-09-22 PROBLEM — F41.1 GENERALIZED ANXIETY DISORDER: Status: ACTIVE | Noted: 2017-09-22

## 2017-09-22 PROBLEM — Z72.0 TOBACCO ABUSE: Status: ACTIVE | Noted: 2017-09-22

## 2017-09-22 PROCEDURE — 25000132 ZZH RX MED GY IP 250 OP 250 PS 637: Performed by: PSYCHIATRY & NEUROLOGY

## 2017-09-22 PROCEDURE — 99238 HOSP IP/OBS DSCHRG MGMT 30/<: CPT | Performed by: PSYCHIATRY & NEUROLOGY

## 2017-09-22 RX ORDER — MULTIPLE VITAMINS W/ MINERALS TAB 9MG-400MCG
1 TAB ORAL DAILY
Qty: 30 EACH | Refills: 0 | Status: SHIPPED | OUTPATIENT
Start: 2017-09-22

## 2017-09-22 RX ORDER — HYDROXYZINE HYDROCHLORIDE 25 MG/1
50 TABLET, FILM COATED ORAL EVERY 4 HOURS PRN
Qty: 60 TABLET | Refills: 0 | Status: SHIPPED | OUTPATIENT
Start: 2017-09-22

## 2017-09-22 RX ORDER — BUSPIRONE HYDROCHLORIDE 15 MG/1
15 TABLET ORAL 2 TIMES DAILY
Qty: 60 TABLET | Refills: 0 | Status: SHIPPED | OUTPATIENT
Start: 2017-09-22

## 2017-09-22 RX ORDER — TRAZODONE HYDROCHLORIDE 50 MG/1
50 TABLET, FILM COATED ORAL
Qty: 30 TABLET | Refills: 0 | Status: SHIPPED | OUTPATIENT
Start: 2017-09-22

## 2017-09-22 RX ORDER — OLANZAPINE 10 MG/1
10 TABLET ORAL AT BEDTIME
Qty: 30 TABLET | Refills: 0 | Status: SHIPPED | OUTPATIENT
Start: 2017-09-22

## 2017-09-22 RX ADMIN — MULTIPLE VITAMINS W/ MINERALS TAB 1 TABLET: TAB at 08:11

## 2017-09-22 RX ADMIN — NICOTINE POLACRILEX 8 MG: 4 GUM, CHEWING ORAL at 08:36

## 2017-09-22 RX ADMIN — Medication 100 MG: at 08:11

## 2017-09-22 RX ADMIN — NICOTINE POLACRILEX 8 MG: 4 GUM, CHEWING ORAL at 12:41

## 2017-09-22 RX ADMIN — NICOTINE POLACRILEX 8 MG: 4 GUM, CHEWING ORAL at 11:14

## 2017-09-22 RX ADMIN — BUSPIRONE HYDROCHLORIDE 15 MG: 15 TABLET ORAL at 08:11

## 2017-09-22 RX ADMIN — HYDROXYZINE HYDROCHLORIDE 50 MG: 25 TABLET ORAL at 11:14

## 2017-09-22 RX ADMIN — FOLIC ACID 1 MG: 1 TABLET ORAL at 08:11

## 2017-09-22 ASSESSMENT — ACTIVITIES OF DAILY LIVING (ADL)
ORAL_HYGIENE: INDEPENDENT
GROOMING: INDEPENDENT
LAUNDRY: WITH SUPERVISION
DRESS: INDEPENDENT

## 2017-09-22 NOTE — PLAN OF CARE
"Problem: Manic Symptoms  Goal: Manic Symptoms  Signs and symptoms of listed problems will be absent or manageable.   Outcome: Adequate for Discharge Date Met:  09/22/17  \"I'm doing better, I learned a lot about myself and other people since I've been here.\"  Indicates was never feeling depressed, \"I'm happy with my life.\"   was having difficulty with anxiety that started about 3 months ago.  Denies ever experiencing suicidal ideation, \"I have a plan for my life, I like myself.\"  Indicates anxiety is now \"10 times better\".  States concentration and focus have always been good.  When asked about racing thoughts states wants to retire by the age of 50 \"so I'm always thinking 5 steps ahead and how I can accomplish these things.\"   only needs about 6 hours of sleep a night \"in the summer\" and is sleeping well.  Feels the Buspar has been beneficial in relieving anxiety, denies side effects from this.  Talked about becoming frustrated with parents as feels \"they treat me like a child and I'm 32 years old.\"  Indicates will be able to stay with them for a short time after discharge as is arranging for own apartment in the next few days.  will be working with primary care physician who will refer him to a psychiatrist and therapist.   is willing to work with these professionals.  Has been visible in milieu, attending some programming and is social with peers.  Was discharged at about 1350 with belongings, instructions and medications, is being picked up by father.      "

## 2017-09-22 NOTE — PLAN OF CARE
Problem: General Plan of Care (Inpatient Behavioral)  Goal: Individualization/Patient Specific Goal (IP Behavioral)  The patient and/or their representative will achieve their patient-specific goals related to the plan of care.    The patient-specific goals include:   Illness Management Recovery model:  Taking Action.     Patient identified the following actions they can take when early warning signs are present in order to prevent relapse:     1. Will take deep breathes  2. Will think before acting  3. Will calm self

## 2017-09-22 NOTE — DISCHARGE SUMMARY
[facility], [place]   Psychiatric Discharge Summary      Brodie Carnes MRN# [id number]   Age: 31 year old YOB: 1985     Date of Admission:  9/19/2017  Date of Discharge:  09/22/17  Admitting Physician:  Darian Colorado MD  Discharge Physician:  Darian Colorado MD         Summary/Hospital Course/Disposition:     Initial Assessment on 9/21: Brodie is a 31 year old male with likely Bipolar disorder, along with strong characterological components of Narcissism and Antisocial Personality Disorder who was admitted on a 72 hour hold for exhibiting agitated/aggressive behavior in the community and in the ED. He was initially in restraints, and then received IM Zyprexa to calm his mood, which he seemed to benefit from. In the inpatient unit today, he is wholly calm, cooperative, attending groups, and compliant to take medications (scheduled for tonight). His interaction today during the interview, along with collateral from his father points to a possibility of elevated mood from Bipolar disorder, with delusions and paranoia present. His behavior waxes and wanes over the course of a day, and is quite reactive to stressors, which could point more towards a characterological/impulse control issue rather than an episode of lovely. Substances such as ETOH, and Cannabis (along with any undetectable drugs) could also be precipitating factors to his behavioral dysregulation. A important biological precipitating factor is his family history of Bipolar disorder in his mother.      Of note, he presents with a cold, calculated tone in his interaction, and could be assumed to be manipulative behavior (characterologic of Narcissism and Antisocial) which could also lead to dysfunction in life, particularly with interpersonal problems,occupational problems, and social problems. Longitudinally, he seems like he will have recurrent problems with his delusions, anger, and personality components,  however at this time, he is not an imminent danger to self/others, and is able to care for basic needs of self. He is living with his parents (since he was kicked out his his apt), and his father feels safe with having him come home if he is back to his calmer baseline attitude. I will continue the 72 hour hold for purposes of having him remain in the hospital for further stabilization. Due to the above mentioned factors, we will plan to discharge him tomorrow.       I will scheduled Zyprexa 10 mg HS for helping him attenuate( and prevent) toxic elevation of his mood, with obtaining better sleep, along with slowing down some of his delusions and anxious thought processes. We discussed the importance of follow up with his PCP, along with getting a referral to outpatient psychiatrist and therapy. It appears he has already made an appointment with a primary care provider at [facility] with the help of .     Hospital Course: As mentioned in the assessment, Brodie's behavior was relatively improved compared to his initial presentation in the ED. He was compliant with his medications, which seemed to help attenuate some of his fast paced thoughts, and help him sleep better. When he was evaluated on his second day of hospitalization (9/22), where continued to exhibit a calm, stable outward appearance, and had agreed to sign in voluntarily, with plans to discharge later today. He denied any SI/HI/AH/VH, and felt subjectively improved. He was willing to continue take medication, follow up with his outpatient provider. He became upset when he was told to wait till his father came to pick him up. Observing his behavior apart from his relatively calm exterior shows a possibility of lovely, however the lovely is not wholly evident and could possibly be underlying a prominent layer of narcissism and antisocial traits.  I have a sense that his behavior is quite characterological and his grandiosity,  agitated, manipulative behaviors are an aspect of his personality disorder. Nevertheless, he didn't not exhibit behaviors that would warrant continued involuntary hold, thus he was discharged with plans to follow up with outpatient provider. He was not in imminent danger to himself, to others, and could care for himself. He was discharged back home. His father was spoken to, who felt safe for his son to come back home, now that his behavior was normalized.     Med Changes: Zyprexa 10 mg HS was scheduled. Buspar 15 mg BID. Atarax 50 mg Q4H PRN    Disposition: Discharged back to his parents's house.          DIagnoses:    Bipolar Disorder vs. Substance Induced Mood Disorder  Traits of Narcissistic Personality Disorder   Traits of Antisocial Personality Disorder   Alcohol Use Disorder   Cannabis Use Disorder   R/o  Impulse Control Disorder          Labs:   No results found for this or any previous visit (from the past 24 hour(s)).         Consults:   None            Discharge Medications:        Review of your medicines      START taking       Dose / Directions    hydrOXYzine 25 MG tablet   Commonly known as:  ATARAX   Used for:  Generalized anxiety disorder        Dose:  50 mg   Take 2 tablets (50 mg) by mouth every 4 hours as needed for anxiety   Quantity:  60 tablet   Refills:  0       multivitamin, therapeutic with minerals Tabs tablet   Used for:  Generalized anxiety disorder        Dose:  1 tablet   Take 1 tablet by mouth daily   Quantity:  30 each   Refills:  0       nicotine polacrilex 4 MG gum   Commonly known as:  NICORETTE   Used for:  Tobacco abuse        Dose:  4 mg   Place 1 each (4 mg) inside cheek every hour as needed for smoking cessation   Quantity:  120 tablet   Refills:  0       OLANZapine 10 MG tablet   Commonly known as:  zyPREXA   Used for:  Bipolar 1 disorder, manic, moderate (H)        Dose:  10 mg   Take 1 tablet (10 mg) by mouth At Bedtime   Quantity:  30 tablet   Refills:  0       traZODone  "50 MG tablet   Commonly known as:  DESYREL   Used for:  Generalized anxiety disorder        Dose:  50 mg   Take 1 tablet (50 mg) by mouth nightly as needed for sleep   Quantity:  30 tablet   Refills:  0         CONTINUE these medicines which may have CHANGED, or have new prescriptions. If we are uncertain of the size of tablets/capsules you have at home, strength may be listed as something that might have changed.       Dose / Directions    busPIRone 15 MG tablet   Commonly known as:  BUSPAR   This may have changed:    - how much to take  - how to take this  - when to take this  - additional instructions   Used for:  Generalized anxiety disorder        Dose:  15 mg   Take 1 tablet (15 mg) by mouth 2 times daily   Quantity:  60 tablet   Refills:  0            Where to get your medicines      These medications were sent to [facility] [place], [place] - [address]  [address] [facility] [address], [facility] [address]     Phone:  [phone]      busPIRone 15 MG tablet     hydrOXYzine 25 MG tablet     multivitamin, therapeutic with minerals Tabs tablet     nicotine polacrilex 4 MG gum     OLANZapine 10 MG tablet     traZODone 50 MG tablet                  Mental Status Examination:   Appearance: White male, slim body habitus, shaved head. Moderate grooming/hygiene. No acute distress.   Attitude:  Cooperative, calm, however an undercurrent of adamant, demanding behavior.   Eye Contact:  Continuous   Mood:  \"I'm fine,nothing is wrong with me\"   Affect:  Euthymic   Speech:  Regular in tone, somewhat fast in rate   Language: fluent and intact in English  Psychomotor, Gait, Musculoskeletal:  Unremarkable for tremors, rigidity, however some noticeable restless behavior.   Throught Process:  Often tangential, circumferential.   Associations:  Some intermittent loosening of associations.   Thought Content:  Possible delusions about situations at home involving rent/housing, etc. No AH/VH, or suicidal/homicidal " ideation, intent, or plan.   Insight:  Poor  Judgement:  Fair   Oriented to:  Person, place, time.   Attention Span and Concentration:  Adequate   Recent and Remote Memory:  Unclear, could have some deficits.   Fund of Knowledge:  Average          Discharge Plan:   No discharge procedures on file.  Please refer to Summary/Hospital course section for details.     1.) D./c on Zyprexa 10 mg HS   2.) D/c back home     Darian Colorado MD  [facility]    Spent  20  minutes on encounter, >50% of which was spent in counseling and/or coordination of care, consisting of reviewing symptoms, discussing medications, need for follow up. Also spent 10 min speaking with patient's father, coordinating discharge.

## 2017-09-22 NOTE — DISCHARGE INSTRUCTIONS
Behavioral Discharge Planning and Instructions      Summary:  You were admitted on 9/19/2017  due to Anxiety, Manic Symptomology and Agressive Behaviors.  You were treated by Dr Darian Colorado MD and discharged on 9/22/2017 from [facility]      Principal Diagnosis: Bipolar Disorder vs. Substance Induced Mood Disorder      Health Care Follow-up Appointments:   [facility]   [address]  [address]  [place] [address]  Phone: [phone]  Fax: [phone] HUC TO FAX DISCHARGE INSTRUCTIONS   *You have a primary care provider appointment scheduled with Vandana Zacarias PA-C for Tuesday, October 3rd at 10:00 am.   Attend all scheduled appointments with your outpatient providers. Call at least 24 hours in advance if you need to reschedule an appointment to ensure continued access to your outpatient providers.   Major Treatments, Procedures and Findings:  You were provided with: a psychiatric assessment, assessed for medical stability, medication evaluation and/or management, group therapy and milieu management    Symptoms to Report: feeling more aggressive, increased confusion, losing more sleep, mood getting worse or thoughts of suicide    Early warning signs can include: increased depression or anxiety sleep disturbances increased thoughts or behaviors of suicide or self-harm  increased unusual thinking, such as paranoia or hearing voices    Safety and Wellness:  Take all medicines as directed.  Make no changes unless your doctor suggests them.      Follow treatment recommendations.  Refrain from alcohol and non-prescribed drugs.  Ask your support system to help you reduce your access to items that could harm yourself or others. If there is a concern for safety, call 911.    Resources:   Crisis Intervention: 261.958.4010 or 914-280-1982 (TTY: 427.987.4342).  Call anytime for help.  National [place] on Mental Illness (www.[place].aurelio.org): 855.524.8881 or  "478.571.6159.  Alcoholics Anonymous (www.alcoholics-anonymous.org): Check your phone book for your local chapter.  Suicide Awareness Voices of Education (SAVE) (www.save.org): 272-125-JYGW (1180)  National Suicide Prevention Line (www.mentalhealthmn.org): 810-680-AULY (8219)  Mental Health Consumer/Survivor Network of [place] (www.mhcsn.net): 476.995.5965 or 943-194-8173  Mental Health Association of [place] (www.mentalhealth.org): 280.748.9886 or 837-855-0260  Self- Management and Recovery Training., Count[employer] To Buy-- Toll free: 499.418.3450  www.[employer]  [facility] Crisis (COPE) Response - Adult 938 226-7942  Text 4 Life: txt \"LIFE\" to 88644 for immediate support and crisis intervention  Crisis text line: Text \"START\" to 535-671. Free, confidential, 24/7.  Crisis Intervention: 922.664.1865 or 397-485-9734. Call anytime for help.     The treatment team has appreciated the opportunity to work with you.     Please take care and make your recovery a daily recovery.   If you have any questions or concerns our unit number is [phone].  Thank you.       "

## 2017-09-25 ENCOUNTER — TELEPHONE (OUTPATIENT)
Dept: BEHAVIORAL HEALTH | Facility: CLINIC | Age: 32
End: 2017-09-25

## 2017-09-25 NOTE — TELEPHONE ENCOUNTER
----- Message from Ladonna Suresh sent at 9/24/2017  9:23 AM CDT -----  Regarding: Appt needed.  Hello we need a [facility] appt for 09-22-17    , pt was on [address]   , [facility] Dept. [id number].            .  Please add billing indicator of ##89##, hold for claim review.          Ladonna Mcdonald  Revenue   [facility], [address] [address] [phone]  [email or web address]

## 2017-09-29 NOTE — ED PROVIDER NOTES
In person face to face assessment completed, including an evaluation of the patient's immediate reaction to the intervention, complete review of systems assessment, behavioral assessment and review/assessment of history, drugs and medications, recent labs, etc., and behavioral condition.  The patient experienced: No adverse physical outcome from seclusion/restraint initiation.  The intervention of restraint or seclusion needs to continue.         Alec Montana MD  09/29/17 [id number]

## 2025-02-13 ENCOUNTER — HOSPITAL ENCOUNTER (EMERGENCY)
Facility: CLINIC | Age: 40
DRG: 885 | End: 2025-02-13
Attending: STUDENT IN AN ORGANIZED HEALTH CARE EDUCATION/TRAINING PROGRAM
Payer: MEDICARE

## 2025-02-13 ENCOUNTER — TELEPHONE (OUTPATIENT)
Dept: BEHAVIORAL HEALTH | Facility: CLINIC | Age: 40
End: 2025-02-13

## 2025-02-13 VITALS
RESPIRATION RATE: 17 BRPM | TEMPERATURE: 98.1 F | SYSTOLIC BLOOD PRESSURE: 128 MMHG | DIASTOLIC BLOOD PRESSURE: 83 MMHG | OXYGEN SATURATION: 96 % | HEART RATE: 110 BPM

## 2025-02-13 DIAGNOSIS — F41.9 ANXIETY: Primary | ICD-10-CM

## 2025-02-13 DIAGNOSIS — R45.850 HOMICIDAL IDEATION: ICD-10-CM

## 2025-02-13 PROBLEM — F29 PSYCHOSIS, UNSPECIFIED PSYCHOSIS TYPE (H): Status: ACTIVE | Noted: 2025-02-13

## 2025-02-13 LAB
AMPHETAMINES UR QL SCN: ABNORMAL
BARBITURATES UR QL SCN: ABNORMAL
BENZODIAZ UR QL SCN: ABNORMAL
BZE UR QL SCN: ABNORMAL
CANNABINOIDS UR QL SCN: ABNORMAL
FENTANYL UR QL: ABNORMAL
OPIATES UR QL SCN: ABNORMAL
PCP QUAL URINE (ROCHE): ABNORMAL

## 2025-02-13 PROCEDURE — 80307 DRUG TEST PRSMV CHEM ANLYZR: CPT | Performed by: STUDENT IN AN ORGANIZED HEALTH CARE EDUCATION/TRAINING PROGRAM

## 2025-02-13 PROCEDURE — 250N000013 HC RX MED GY IP 250 OP 250 PS 637: Performed by: STUDENT IN AN ORGANIZED HEALTH CARE EDUCATION/TRAINING PROGRAM

## 2025-02-13 PROCEDURE — 99285 EMERGENCY DEPT VISIT HI MDM: CPT | Performed by: STUDENT IN AN ORGANIZED HEALTH CARE EDUCATION/TRAINING PROGRAM

## 2025-02-13 PROCEDURE — 93005 ELECTROCARDIOGRAM TRACING: CPT | Performed by: STUDENT IN AN ORGANIZED HEALTH CARE EDUCATION/TRAINING PROGRAM

## 2025-02-13 PROCEDURE — 99285 EMERGENCY DEPT VISIT HI MDM: CPT | Mod: 25 | Performed by: STUDENT IN AN ORGANIZED HEALTH CARE EDUCATION/TRAINING PROGRAM

## 2025-02-13 RX ORDER — ESCITALOPRAM OXALATE 5 MG/1
1 TABLET ORAL DAILY
Status: ON HOLD | COMMUNITY
Start: 2024-12-24 | End: 2025-02-17

## 2025-02-13 RX ORDER — OLANZAPINE 5 MG/1
5 TABLET, ORALLY DISINTEGRATING ORAL ONCE
Status: COMPLETED | OUTPATIENT
Start: 2025-02-13 | End: 2025-02-13

## 2025-02-13 RX ORDER — LORAZEPAM 1 MG/1
1 TABLET ORAL ONCE
Status: COMPLETED | OUTPATIENT
Start: 2025-02-13 | End: 2025-02-13

## 2025-02-13 RX ADMIN — OLANZAPINE 5 MG: 5 TABLET, ORALLY DISINTEGRATING ORAL at 19:32

## 2025-02-13 RX ADMIN — OLANZAPINE 5 MG: 5 TABLET, ORALLY DISINTEGRATING ORAL at 21:09

## 2025-02-13 RX ADMIN — LORAZEPAM 1 MG: 1 TABLET ORAL at 22:28

## 2025-02-13 ASSESSMENT — ACTIVITIES OF DAILY LIVING (ADL)
ADLS_ACUITY_SCORE: 41

## 2025-02-13 ASSESSMENT — COLUMBIA-SUICIDE SEVERITY RATING SCALE - C-SSRS
1. IN THE PAST MONTH, HAVE YOU WISHED YOU WERE DEAD OR WISHED YOU COULD GO TO SLEEP AND NOT WAKE UP?: NO
2. HAVE YOU ACTUALLY HAD ANY THOUGHTS OF KILLING YOURSELF IN THE PAST MONTH?: NO
6. HAVE YOU EVER DONE ANYTHING, STARTED TO DO ANYTHING, OR PREPARED TO DO ANYTHING TO END YOUR LIFE?: NO

## 2025-02-14 PROBLEM — F31.12 BIPOLAR 1 DISORDER, MANIC, MODERATE (H): Status: ACTIVE | Noted: 2017-09-21

## 2025-02-14 PROBLEM — F60.81 NARCISSISTIC PERSONALITY DISORDER (H): Status: ACTIVE | Noted: 2017-09-21

## 2025-02-14 PROBLEM — F30.9 MANIA (H): Status: ACTIVE | Noted: 2017-09-20

## 2025-02-14 PROBLEM — R45.1 AGITATION: Status: ACTIVE | Noted: 2025-02-14

## 2025-02-14 PROCEDURE — 99223 1ST HOSP IP/OBS HIGH 75: CPT

## 2025-02-14 PROCEDURE — 250N000013 HC RX MED GY IP 250 OP 250 PS 637: Performed by: EMERGENCY MEDICINE

## 2025-02-14 PROCEDURE — 99418 PROLNG IP/OBS E/M EA 15 MIN: CPT

## 2025-02-14 PROCEDURE — 250N000013 HC RX MED GY IP 250 OP 250 PS 637: Performed by: FAMILY MEDICINE

## 2025-02-14 RX ORDER — ESCITALOPRAM OXALATE 20 MG/1
20 TABLET ORAL DAILY
Status: DISCONTINUED | OUTPATIENT
Start: 2025-02-15 | End: 2025-02-15

## 2025-02-14 RX ORDER — ESCITALOPRAM OXALATE 5 MG/1
5 TABLET ORAL DAILY
Status: DISCONTINUED | OUTPATIENT
Start: 2025-02-14 | End: 2025-02-14

## 2025-02-14 RX ORDER — ACETAMINOPHEN 500 MG
1000 TABLET ORAL EVERY 6 HOURS PRN
Status: DISCONTINUED | OUTPATIENT
Start: 2025-02-14 | End: 2025-02-17 | Stop reason: HOSPADM

## 2025-02-14 RX ORDER — LORAZEPAM 1 MG/1
1 TABLET ORAL EVERY 8 HOURS PRN
Status: DISCONTINUED | OUTPATIENT
Start: 2025-02-14 | End: 2025-02-14

## 2025-02-14 RX ORDER — OLANZAPINE 10 MG/1
10 TABLET, ORALLY DISINTEGRATING ORAL 2 TIMES DAILY PRN
Status: DISCONTINUED | OUTPATIENT
Start: 2025-02-14 | End: 2025-02-15

## 2025-02-14 RX ORDER — IBUPROFEN 600 MG/1
600 TABLET, FILM COATED ORAL EVERY 6 HOURS PRN
Status: DISCONTINUED | OUTPATIENT
Start: 2025-02-14 | End: 2025-02-17 | Stop reason: HOSPADM

## 2025-02-14 RX ADMIN — NICOTINE POLACRILEX 2 MG: 2 GUM, CHEWING BUCCAL at 20:15

## 2025-02-14 RX ADMIN — ESCITALOPRAM OXALATE 5 MG: 5 TABLET, FILM COATED ORAL at 09:31

## 2025-02-14 RX ADMIN — NICOTINE POLACRILEX 2 MG: 2 GUM, CHEWING BUCCAL at 18:44

## 2025-02-14 RX ADMIN — ESCITALOPRAM OXALATE 15 MG: 5 TABLET, FILM COATED ORAL at 15:37

## 2025-02-14 ASSESSMENT — ACTIVITIES OF DAILY LIVING (ADL)
ADLS_ACUITY_SCORE: 41
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
## 2025-02-14 NOTE — CONSULTS
"Diagnostic Evaluation Consultation  Crisis Assessment    Patient Name: Brodie Carnes  Age:  39 year old  Legal Sex: male  Gender Identity: male  Pronouns:   Race: White  Ethnicity: Not  or   Language: English      Patient was assessed: Virtual: iPad   Crisis Assessment Start Date: 02/13/25  Crisis Assessment Start Time: 1946  Crisis Assessment Stop Time: 2017  Patient location: [facility]                             [id number]    Referral Data and Chief Complaint  Brodie Carnes presents to the ED via police. Patient is presenting to the ED for the following concerns: Significant behavioral change (Homicidal Ideation). Factors that make the mental health crisis life threatening or complex are: Pt presents to the ED via EMS after his mother called the police several times earlier today due to pt making homicidal statements towards her. Per mother, pt has been staying in various hotels and calling his mother daily, telling her that she is crazy, among other insults. Things escalated today when pt called his mother at 6 AM and told her that he was going to kill her by setting her house on fire while she is in it. Pt then showed up at mother's home but was never let inside. When PD arrived, pt agreed to go to the hospital via EMS voluntarily. Upon assessment, pt denies these reported events and states, \"I would never do anything like that to my mother, I love her very much.\" Pt continues to perseverate on his belief that his mother is mentally ill. Pt states, \"My mother brought me here, it's not the first time she's done this. She is the crazy one. She should be sitting in this chair, and not me.\" Pt also states that he has been living with his parents which has been stressful due to conflict with his mother, which is incongruent with mother's report. Pt does endorse worsening anxiety and describes it as, \"Feeling horrible, over thinking, trying too hard, never being good " "enough.\" Pt also states that he has seasonal depression. Pt denies feeling paranoid or violent towards others. Pt denies SI, HI, SIB, AH/VH. Pt endorses daily alcohol use but doesn't disclose how much. Pt starts crying when asked about hx of suicide attempts, dramatically changing his affect. Pt then states, \"That version of Brodie was a different person. I'm a warrior, I'm confident now.\" Pt reports recent stressor of injuring his leg; Pt states, \"I got myself into some trouble when I was living at the hotel,\" and reports not having any money, claiming his mother stole from him. Pt denies being on medications.      Informed Consent and Assessment Methods  Explained the crisis assessment process, including applicable information disclosures and limits to confidentiality, assessed understanding of the process, and obtained consent to proceed with the assessment.  Assessment methods included conducting a formal interview with patient, review of medical records, collaboration with medical staff, and obtaining relevant collateral information from family and community providers when available.  : done     History of the Crisis   Brodie carries previous diagnoses of Bipolar 1 disorder, Schizophrenia, NURY, and Narcissistic personality disorder, per chart. Per collateral information from mother, pt has been calling her daily telling her insults, but she has never heard him make a homicidal statement until today. Pt has also recently shaved his head, which is not normal for him.   Pt has hx of [facility] admissions, most recently in December of 2024 at [facility]. Pt was also hospitalized in 2022 following a suicide attempt of cutting his neck. At this time, pt was also presenting with paranoia and disorganized thinking. Parents note that pt has had difficulty functioning since this attempt in 2022. Pt is prescribed medication and was referred to outpatient psychiatry in December of 2024 but has not followed up with referral or " taken medications as prescribed. Pt has hx of civil commitment that ended in 2023. Pt reports being unemployed and staying in various hotels. Pt reports lack of social support.    Brief Psychosocial History  Family:  Single, Children no  Support System:  None  Employment Status:  unemployed  Source of Income:  unable to assess  Financial Environmental Concerns:  unable to afford rent/mortgage, unemployed  Current Hobbies:   (basketball, going to gym, being outside)  Barriers in Personal Life:   (leg injury, mental health concerns)    Significant Clinical History  Current Anxiety Symptoms:  excessive worry, racing thoughts, anxious  Current Depression/Trauma:  impaired decision making, crying or feels like crying  Current Somatic Symptoms:  racing thoughts, wandering  Current Psychosis/Thought Disturbance:  agitation, impulsive, high risk behavior, hostile/aggressive, displaces blame  Current Eating Symptoms:   (no concerns noted)  Chemical Use History:  Alcohol: Daily  Last Use:: 02/13/25  Benzodiazepines: None  Opiates: None  Cocaine: None  Marijuana: None  Other Use: None   Past diagnosis:  Bipolar Disorder, Schizophrenia, Anxiety Disorder, Personality Disorder  Family history:  No known history of mental health or chemical health concerns  Past treatment:  Inpatient Hospitalization, Psychiatric Medication Management  Details of most recent treatment:  Pt does not have any outpatient providers due to not following up on referrals from last [facility] in December of 2024.  Other relevant history:  Pt has hx of civil commitment in [facility] from [id number].    Have there been any medication changes in the past two weeks:  patient is not on psychiatric meds       Is the patient compliant with medications:  no  Pt not on medications due to lack of insight     Collateral Information  Is there collateral information: Yes     Collateral information name, relationship, phone number:  Nia Carnes, Mother,[phone].  "Duty to warn is not needed due to pt making threat directly to mother.     What happened today: \"He has bipolar and schizophrenia. He called me at six in the morning, \"You bitch, I will kill you, bring me money.\" and then he called my . I went to [facility] and filed a report. They have it all on file. He came to the house, I saw him, and then I called police. They brought him to the ER, but I don't know if he went willingly. He stayed for three days in the hospital about a month and a half ago at [facility].\"     What is different about patient's functioning: \"He's been very angry. He is not the Brodie that I know. He shaved his head. I hear from him almost everyday, tells me \"You bitch.\" I try to help him, I try everything, try to get him to go to the hospital, I do his shopping. He was staying in hotels. I don't know what triggered this. He has the same issue two years ago, we called the police, he was admitted to the hospital. He was trying to commit suicide, and since then, he has been drinking alcohol everyday. Not taking the medications he is prescribed. He is in denial and doesn't think he is sick. He knows how to play people, he knows how to play the game well. He is unbelievably different individual.     What do you think the patient needs:  inpatient    Has patient made comments about wanting to kill themselves/others: yes    If d/c is recommended, can they take part in safety/aftercare planning:  no    Additional collateral information:  \"No he cannot come home, I'm scared. He is saying he will kill me. It's not safe. If you let him go, you will just see him again tomorrow. I will call the police if he shows up at the house again.\"     Risk Assessment  [place] Suicide Severity Rating Scale Full Clinical Version:  Suicidal Ideation  Q1 Wish to be Dead (Lifetime): Yes  Q2 Non-Specific Active Suicidal Thoughts (Lifetime): Yes  3. Active Suicidal Ideation with any Methods (Not Plan) Without " "Intent to Act (Lifetime): No  4. Active Suicidal Ideation with Some Intent to Act, Without Specific Plan (Lifetime): Yes  5. Active Suicidal Ideation with Specific Plan and Intent (Lifetime): No  Q6 Suicide Behavior (Lifetime): no  Intensity of Ideation (Lifetime)  Most Severe Ideation Rating (Lifetime): 5  Description of Most Severe Ideation (Lifetime): Pt references suicide attempt in 2022 when he cut his neck. Pt states, \"I don't know, I'm a completely different person now.'  Frequency (Lifetime):  (Unable to assess)  Duration (Lifetime):  (Unable to assess)  Controllability (Lifetime):  (Unable to assess)  Deterrents (Lifetime):  (Unable to assess)  Reasons for Ideation (Lifetime):  (Unable to assess)  Suicidal Behavior (Lifetime)  Actual Attempt (Lifetime): Yes  Total Number of Actual Attempts (Lifetime): 1  Actual Attempt Description (Lifetime): Per chart, pt made a large laceration on his neck with a sharp instrument, followed by involuntarily admission to  in 2022. Pt denies descriptors and states, \"I can't talk about it\"  Has subject engaged in non-suicidal self-injurious behavior? (Lifetime): No  Interrupted Attempts (Lifetime): No  Aborted or Self-Interrupted Attempt (Lifetime): No  Preparatory Acts or Behavior (Lifetime): No    [place] Suicide Severity Rating Scale Recent:   Suicidal Ideation (Recent)  Q1 Wished to be Dead (Past Month): no  Q2 Suicidal Thoughts (Past Month): no  Level of Risk per Screen: no risks indicated  Intensity of Ideation (Recent)  Description of Most Severe Ideation (Past 1 Month): denies; per chart, pt endorsed SI in December of 2024 at [facility]  Frequency (Past 1 Month):  (N/A pt denies)  Duration (Past 1 Month):  (N/A pt denies)  Controllability (Past 1 Month):  (N/A pt denies)  Deterrents (Past 1 Month):  (N/A pt denies)  Reasons for Ideation (Past 1 Month):  (N/A pt denies)  Suicidal Behavior (Recent)  Actual Attempt (Past 3 Months): No  Has subject engaged in " non-suicidal self-injurious behavior? (Past 3 Months): No  Interrupted Attempts (Past 3 Months): No  Aborted or Self-Interrupted Attempt (Past 3 Months): No  Preparatory Acts or Behavior (Past 3 Months): No    Environmental or Psychosocial Events: challenging interpersonal relationships, social isolation, neither working nor attending school, unstable housing, homelessness, impulsivity/recklessness, ongoing abuse of substances  Protective Factors: Protective Factors: optimistic outlook - identification of future goals    Does the patient have thoughts of harming others? Feels Like Hurting Others: other (see comments) (Pt denies; Per EMS and MD, pt reported he was homicidal towards his mother.)  Previous Attempt to Hurt Others: no  Current presentation:  (Labile)  Is the patient engaging in sexually inappropriate behavior?: no  Does Patient have a known history of aggressive behavior: Yes  Where/who has aggression been against (people, property, self, etc): Unknown  When was the last episode of aggression: Per chart, ED visit in 2017  Where has the violence occurred (home, community, school): Hospital  Trigger to aggression (if known): Unknown  Has aggression occurred as a result of MH concerns/diagnosis: Likely  Does patient have history of aggression in hospital: Yes (2017)    Is the patient engaging in sexually inappropriate behavior?  no        Mental Status Exam   Affect: Labile  Appearance: Appropriate  Attention Span/Concentration: Attentive  Eye Contact: Engaged    Fund of Knowledge: Appropriate   Language /Speech Content: Fluent  Language /Speech Volume: Normal  Language /Speech Rate/Productions: Normal  Recent Memory: Intact  Remote Memory: Intact  Mood: Normal  Orientation to Person: Yes   Orientation to Place: Yes  Orientation to Time of Day: Yes  Orientation to Date: Yes     Situation (Do they understand why they are here?): No  Psychomotor Behavior: Normal  Thought Content: Clear  Thought Form:  "Obsessive/Perseverative        Medication  Psychotropic medications:   Medication Orders - Psychiatric (From admission, onward)      None             Current Care Team  Patient Care Team:  No Ref-Primary, Physician as PCP - General    Diagnosis  Patient Active Problem List   Diagnosis Code    Emily (H) F30.9    Narcissistic personality disorder (H) F60.81    Bipolar 1 disorder, manic, moderate (H) F31.12    Generalized anxiety disorder F41.1    Tobacco abuse Z72.0    Psychosis, unspecified psychosis type (H) F29       Primary Problem This Admission  Active Hospital Problems    *Psychosis, unspecified psychosis type (H)        Clinical Summary and Substantiation of Recommendations   Clinical Substantiation:  It is the recommendation of this clinician that pt admit to IP  for safety and stabilization. Pt displays the following risk factors that support IP admission: Pt made homicidal statements to EMS and attending provider, reporting plans to burn his mother by setting the house on fire. Pt denies this during assessment. Pt does present with labile affect, abruptly crying when asked about hx of suicide attempts and quickly changing demeanor stating, \"I'm a warrior and confident. That person isn't me.\"  Pt makes several delusional statements about his mother being out to get him due to her being mentally ill, while denying prior mental illness diagnoses for himself.  Pt has other risk factors of being off medications, not engaging in outpatient services, homeless, ongoing use of alcohol, impulsive behavior, and no social support network. Pt lacks insight into illness and is not a reliable historian. Pt has notable hx of Schizophrenia, Bipolar 1 disorder, NURY, and Narcissistic personality disorder. Pt is unable to engage in safety planning to mitigate risk level in a non-secure setting. Lower levels of care would not be sufficient in managing the level of risk pt is presenting with. Due to this IP is the least " restrictive option of care for pt. Pt should remain in IP until deemed safe to return to the community and engage in OP MH supports. Pt will need assistance establishing OP MH services prior to discharge.    Goals for crisis stabilization:  Safety, stabilization, reduction of HI    Next steps for Care Team:  While awaiting IPMH, pt will meet with LMHP and psych provider.    Treatment Objectives Addressed:  orienting the patient to therapy, assessing safety    Therapeutic Interventions:  Completed behavior chain analysis., Engaged in guided discovery, explored patient's perspectives and helped expand them through socratic dialogue.    Has a specific means been identified for suicidal/homicide actions: Yes    If yes, describe:  Pt denies HI to writer, but discloses plan to burn house with his mother in it to EMS staff and attending provider.    Explain action steps toward mitigation:  Spoke with pt's mother who was aware of this ideation. Pt denies when asked about HI.    Document completion of mitigation actions:  Pt is being recommended IPMH, risk is mitigated currently.    The follow up action still needed prior to discharge:  Safety plan with pt and his mother regarding HI prior to discharge.    Patient coping skills attempted to reduce the crisis:  Pt engages in crisis assessment.    Disposition  Recommended referrals:  (Inpatient Mental Health)        Reviewed case and recommendations with attending provider. Attending Name: Dr. Bethea       Attending concurs with disposition: yes       Patient and/or validated legal guardian concurs with disposition:   yes       Final disposition:  inpatient mental health         Imminent risk of harm: Homicidal Thoughts or Behaviors  Severe psychiatric, behavioral or other comorbid conditions are appropriate for management at inpatient mental health as indicated by at least one of the following: Impaired impulse control, judgement, or insight, Symptoms of impact to  function  Severe dysfunction in daily living is present as indicated by at least one of the following: Other evidence of severe dysfunction  Situation and expectations are appropriate for inpatient care: Patient management/treatment at lower level of care is not feasible or is inappropriate, Around-the-clock medical and nursing care to address symptoms and initiate intervention is required  Inpatient mental health services are necessary to meet patient needs and at least one of the following: Specific condition related to admission diagnosis is present and judged likely to deteriorate in absence of treatment at proposed level of care      Legal status: Voluntary/Patient has signed consent for treatment (Per MD, pt holdable if he wants to leave)                                                                                                                                 Reviewed court records: yes       Assessment Details   Total duration spent with the patient: 31 min     CPT code(s) utilized: 47766 - Psychotherapy for Crisis - 60 (30-74*) min    TONY Wagner, Psychotherapist  [facility] - Triage & Transition Services  Callback: [phone]

## 2025-02-14 NOTE — ED TRIAGE NOTES
Pt comes via EMS from home, pt lives at home with his parents who are elderly hx of schizophrenia, anxiety bipolar unsure of med compliance, PD intervened twice today for a similar problem.  Wasn't placed on a hold.  When EMS arrived pt voluntary came to hospital pt appears to be very manic with EMS.     The pt denies all claims of him saying he was going to burn his house down with his elderly parents inside and denies having done anything. He states his mother simply got home and just called the police on him. Pt denies drug use admits he had some beers and seltzers today.

## 2025-02-14 NOTE — ED NOTES
IP MH Referral Acuity Rating Score (RARS)    LMHP complete at referral to IP MH, with [facility]; and, daily while awaiting IP MH placement. Call score to PPS.  CRITERIA SCORING   New 72 HH and Involuntary for IP MH (not adolescent) 0/1   Boarding over 24 hours 1/1   Vulnerable adult at least 55+ with multiple co morbidities; or, Patient age 11 or under 0/1   Suicide ideation without relief of precipitating factors 0/1   Current plan for suicide 0/1   Current plan for homicide 0/1   Imminent risk or actual attempt to seriously harm another without relief of factors precipitating the attempt 1/1   Severe dysfunction in daily living (ex: complete neglect for self care, extreme disruption in vegetative function, extreme deterioration in social interactions) 1/1   Recent (last 2 weeks) or current physical aggression in the ED 0/1   Restraints or seclusion episode in ED 0/1   Verbal aggression, agitation, yelling, etc., while in the ED 0/1   Active psychosis with psychomotor agitation or catatonia 1/1   Need for constant or near constant redirection (from leaving, from others, etc).  0/1   Intrusive or disruptive behaviors 0/1   TOTAL Acuity Total Score: 4

## 2025-02-14 NOTE — ED TRIAGE NOTES
Triage Assessment (Adult)       Row Name 02/13/25 1922 02/13/25 1915       Triage Assessment    Airway WDL WDL WDL       Respiratory WDL    Respiratory WDL WDL WDL       Skin Circulation/Temperature WDL    Skin Circulation/Temperature WDL -- WDL       Cardiac WDL    Cardiac WDL WDL WDL       Peripheral/Neurovascular WDL    Peripheral Neurovascular WDL WDL WDL       Cognitive/Neuro/Behavioral WDL    Cognitive/Neuro/Behavioral WDL WDL WDL

## 2025-02-14 NOTE — ED NOTES
IP MH Referral Acuity Rating Score (RARS)    LMHP complete at referral to IP MH, with [facility]; and, daily while awaiting IP MH placement. Call score to PPS.  CRITERIA SCORING   New 72 HH and Involuntary for IP MH (not adolescent) 0/3   Boarding over 24 hours 0/1   Vulnerable adult at least 55+ with multiple co morbidities; or, Patient age 11 or under 0/1   Suicide ideation without relief of precipitating factors 0/1   Current plan for suicide 0/1   Current plan for homicide 1/1   Imminent risk or actual attempt to seriously harm another without relief of factors precipitating the attempt 1/1   Severe dysfunction in daily living (ex: complete neglect for self care, extreme disruption in vegetative function, extreme deterioration in social interactions) 1/1   Recent (last 2 weeks) or current physical aggression in the ED 0/1   Restraints or seclusion episode in ED 0/1   Verbal aggression, agitation, yelling, etc., while in the ED 0/1   Active psychosis with psychomotor agitation or catatonia 0/1   Need for constant or near constant redirection (from leaving, from others, etc).  0/1   Intrusive or disruptive behaviors 0/1   TOTAL 3      Lashaun JIMENEZ
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
## 2025-02-14 NOTE — ED NOTES
"Pt came from the Adult ED. When pt arrived he seemed quite anxious. Pt asked for another PRN medication. Writer messaged the provider who ordered him a 1 time dose of ativan. While giving him the ativan, pt kept saying, \"I shouldn't even be here\" and seemed irritated. Pt was placed in a room once a pt left, which he seemed eager about, rather than laying on a recliner in the milieu.   " Pt MRN [id number]
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
## 2025-02-14 NOTE — MEDICATION SCRIBE - ADMISSION MEDICATION HISTORY
Medication Scribe Admission Medication History    Admission medication history is complete. The information provided in this note is only as accurate as the sources available at the time of the update.    Information Source(s): Patient and CareEverywhere/SureScripts via in-person    Pertinent Information: N/A    Changes made to PTA medication list:  Added: lexapro 5mg  Deleted: buspar 15mg, atarax 25mg, zyprexa 10mg, trazodone 50mg  Changed: None    Allergies reviewed with patient and updates made in EHR: yes    Medication History Completed By: Padma White 2/13/2025 10:44 PM    PTA Med List   Medication Sig Last Dose/Taking    escitalopram (LEXAPRO) 5 MG tablet Take 1 tablet by mouth daily. More than a month

## 2025-02-14 NOTE — ED NOTES
Bed: [facility]-  Expected date:   Expected time:   Means of arrival:   Comments:  [id number] 35M SI on a hold ETA 15 mins
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
## 2025-02-14 NOTE — ED TRIAGE NOTES
Triage Assessment (Adult)       Row Name 02/13/25 1915          Triage Assessment    Airway WDL WDL        Respiratory WDL    Respiratory WDL WDL        Skin Circulation/Temperature WDL    Skin Circulation/Temperature WDL WDL        Cardiac WDL    Cardiac WDL WDL        Peripheral/Neurovascular WDL    Peripheral Neurovascular WDL WDL        Cognitive/Neuro/Behavioral WDL    Cognitive/Neuro/Behavioral WDL WDL
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
## 2025-02-15 PROBLEM — R45.850 HOMICIDAL IDEATION: Status: ACTIVE | Noted: 2025-02-15

## 2025-02-15 LAB
ALBUMIN SERPL BCG-MCNC: 4 G/DL (ref 3.5–5.2)
ALP SERPL-CCNC: 65 U/L (ref 40–150)
ALT SERPL W P-5'-P-CCNC: 53 U/L (ref 0–70)
ANION GAP SERPL CALCULATED.3IONS-SCNC: 14 MMOL/L (ref 7–15)
AST SERPL W P-5'-P-CCNC: 36 U/L (ref 0–45)
BASOPHILS # BLD AUTO: 0 10E3/UL (ref 0–0.2)
BASOPHILS NFR BLD AUTO: 0 %
BILIRUB SERPL-MCNC: 0.7 MG/DL
BUN SERPL-MCNC: 11.3 MG/DL (ref 6–20)
CALCIUM SERPL-MCNC: 9.6 MG/DL (ref 8.8–10.4)
CHLORIDE SERPL-SCNC: 101 MMOL/L (ref 98–107)
CHOLEST SERPL-MCNC: 213 MG/DL
CREAT SERPL-MCNC: 0.66 MG/DL (ref 0.67–1.17)
EGFRCR SERPLBLD CKD-EPI 2021: >90 ML/MIN/1.73M2
EOSINOPHIL # BLD AUTO: 0.4 10E3/UL (ref 0–0.7)
EOSINOPHIL NFR BLD AUTO: 6 %
ERYTHROCYTE [DISTWIDTH] IN BLOOD BY AUTOMATED COUNT: 12 % (ref 10–15)
EST. AVERAGE GLUCOSE BLD GHB EST-MCNC: 105 MG/DL
FOLATE SERPL-MCNC: 11.6 NG/ML (ref 4.6–34.8)
GGT SERPL-CCNC: 28 U/L (ref 8–61)
GLUCOSE SERPL-MCNC: 165 MG/DL (ref 70–99)
HBA1C MFR BLD: 5.3 %
HCO3 SERPL-SCNC: 24 MMOL/L (ref 22–29)
HCT VFR BLD AUTO: 43 % (ref 40–53)
HDLC SERPL-MCNC: 81 MG/DL
HGB BLD-MCNC: 14.9 G/DL (ref 13.3–17.7)
IMM GRANULOCYTES # BLD: 0 10E3/UL
IMM GRANULOCYTES NFR BLD: 0 %
LDLC SERPL CALC-MCNC: 111 MG/DL
LYMPHOCYTES # BLD AUTO: 1.2 10E3/UL (ref 0.8–5.3)
LYMPHOCYTES NFR BLD AUTO: 15 %
MCH RBC QN AUTO: 32.9 PG (ref 26.5–33)
MCHC RBC AUTO-ENTMCNC: 34.7 G/DL (ref 31.5–36.5)
MCV RBC AUTO: 95 FL (ref 78–100)
MONOCYTES # BLD AUTO: 0.5 10E3/UL (ref 0–1.3)
MONOCYTES NFR BLD AUTO: 7 %
NEUTROPHILS # BLD AUTO: 5.6 10E3/UL (ref 1.6–8.3)
NEUTROPHILS NFR BLD AUTO: 72 %
NONHDLC SERPL-MCNC: 132 MG/DL
NRBC # BLD AUTO: 0 10E3/UL
NRBC BLD AUTO-RTO: 0 /100
PLATELET # BLD AUTO: 203 10E3/UL (ref 150–450)
POTASSIUM SERPL-SCNC: 4 MMOL/L (ref 3.4–5.3)
PROT SERPL-MCNC: 7 G/DL (ref 6.4–8.3)
RBC # BLD AUTO: 4.53 10E6/UL (ref 4.4–5.9)
SODIUM SERPL-SCNC: 139 MMOL/L (ref 135–145)
TRIGL SERPL-MCNC: 106 MG/DL
TSH SERPL DL<=0.005 MIU/L-ACNC: 0.81 UIU/ML (ref 0.3–4.2)
VIT B12 SERPL-MCNC: 494 PG/ML (ref 232–1245)
WBC # BLD AUTO: 7.9 10E3/UL (ref 4–11)

## 2025-02-15 PROCEDURE — 124N000002 HC R&B MH UMMC

## 2025-02-15 PROCEDURE — 250N000013 HC RX MED GY IP 250 OP 250 PS 637

## 2025-02-15 PROCEDURE — 36415 COLL VENOUS BLD VENIPUNCTURE: CPT

## 2025-02-15 PROCEDURE — 93010 ELECTROCARDIOGRAM REPORT: CPT | Performed by: INTERNAL MEDICINE

## 2025-02-15 PROCEDURE — 82977 ASSAY OF GGT: CPT

## 2025-02-15 PROCEDURE — 82746 ASSAY OF FOLIC ACID SERUM: CPT

## 2025-02-15 PROCEDURE — 83036 HEMOGLOBIN GLYCOSYLATED A1C: CPT

## 2025-02-15 PROCEDURE — 85025 COMPLETE CBC W/AUTO DIFF WBC: CPT

## 2025-02-15 PROCEDURE — 80053 COMPREHEN METABOLIC PANEL: CPT

## 2025-02-15 PROCEDURE — 250N000013 HC RX MED GY IP 250 OP 250 PS 637: Performed by: FAMILY MEDICINE

## 2025-02-15 PROCEDURE — 84443 ASSAY THYROID STIM HORMONE: CPT

## 2025-02-15 PROCEDURE — 82465 ASSAY BLD/SERUM CHOLESTEROL: CPT

## 2025-02-15 PROCEDURE — 82607 VITAMIN B-12: CPT

## 2025-02-15 RX ORDER — ACETAMINOPHEN 325 MG/1
650 TABLET ORAL EVERY 4 HOURS PRN
Status: DISCONTINUED | OUTPATIENT
Start: 2025-02-15 | End: 2025-02-15

## 2025-02-15 RX ORDER — MAGNESIUM HYDROXIDE/ALUMINUM HYDROXICE/SIMETHICONE 120; 1200; 1200 MG/30ML; MG/30ML; MG/30ML
30 SUSPENSION ORAL EVERY 4 HOURS PRN
Status: DISCONTINUED | OUTPATIENT
Start: 2025-02-15 | End: 2025-02-17 | Stop reason: HOSPADM

## 2025-02-15 RX ORDER — POLYETHYLENE GLYCOL 3350 17 G/17G
17 POWDER, FOR SOLUTION ORAL DAILY PRN
Status: DISCONTINUED | OUTPATIENT
Start: 2025-02-15 | End: 2025-02-17 | Stop reason: HOSPADM

## 2025-02-15 RX ORDER — OLANZAPINE 10 MG/1
10 TABLET ORAL 3 TIMES DAILY PRN
Status: DISCONTINUED | OUTPATIENT
Start: 2025-02-15 | End: 2025-02-17 | Stop reason: HOSPADM

## 2025-02-15 RX ORDER — IBUPROFEN 400 MG/1
400 TABLET, FILM COATED ORAL EVERY 6 HOURS PRN
Status: DISCONTINUED | OUTPATIENT
Start: 2025-02-15 | End: 2025-02-17

## 2025-02-15 RX ORDER — OLANZAPINE 10 MG/2ML
10 INJECTION, POWDER, FOR SOLUTION INTRAMUSCULAR 3 TIMES DAILY PRN
Status: DISCONTINUED | OUTPATIENT
Start: 2025-02-15 | End: 2025-02-17 | Stop reason: HOSPADM

## 2025-02-15 RX ORDER — HYDROXYZINE HYDROCHLORIDE 25 MG/1
25 TABLET, FILM COATED ORAL EVERY 4 HOURS PRN
Status: DISCONTINUED | OUTPATIENT
Start: 2025-02-15 | End: 2025-02-17 | Stop reason: HOSPADM

## 2025-02-15 RX ADMIN — ESCITALOPRAM OXALATE 20 MG: 20 TABLET ORAL at 08:01

## 2025-02-15 RX ADMIN — NICOTINE POLACRILEX 4 MG: 2 GUM, CHEWING BUCCAL at 13:43

## 2025-02-15 RX ADMIN — HYDROXYZINE HYDROCHLORIDE 25 MG: 25 TABLET, FILM COATED ORAL at 08:04

## 2025-02-15 RX ADMIN — OLANZAPINE 10 MG: 10 TABLET, FILM COATED ORAL at 16:24

## 2025-02-15 RX ADMIN — NICOTINE POLACRILEX 2 MG: 2 GUM, CHEWING BUCCAL at 10:36

## 2025-02-15 RX ADMIN — NICOTINE POLACRILEX 2 MG: 2 GUM, CHEWING BUCCAL at 03:50

## 2025-02-15 RX ADMIN — ACETAMINOPHEN 1000 MG: 500 TABLET ORAL at 16:24

## 2025-02-15 RX ADMIN — NICOTINE POLACRILEX 2 MG: 2 GUM, CHEWING BUCCAL at 08:12

## 2025-02-15 RX ADMIN — NICOTINE POLACRILEX 4 MG: 2 GUM, CHEWING BUCCAL at 16:00

## 2025-02-15 RX ADMIN — NICOTINE POLACRILEX 4 MG: 2 GUM, CHEWING BUCCAL at 20:23

## 2025-02-15 RX ADMIN — NICOTINE POLACRILEX 4 MG: 2 GUM, CHEWING BUCCAL at 17:53

## 2025-02-15 RX ADMIN — HYDROXYZINE HYDROCHLORIDE 25 MG: 25 TABLET, FILM COATED ORAL at 20:23

## 2025-02-15 RX ADMIN — NICOTINE POLACRILEX 4 MG: 2 GUM, CHEWING BUCCAL at 12:30

## 2025-02-15 RX ADMIN — ACETAMINOPHEN 1000 MG: 500 TABLET ORAL at 08:04

## 2025-02-15 RX ADMIN — NICOTINE POLACRILEX 4 MG: 2 GUM, CHEWING BUCCAL at 09:18

## 2025-02-15 RX ADMIN — NICOTINE POLACRILEX 4 MG: 2 GUM, CHEWING BUCCAL at 19:03

## 2025-02-15 RX ADMIN — NICOTINE POLACRILEX 2 MG: 2 GUM, CHEWING BUCCAL at 06:06

## 2025-02-15 RX ADMIN — HYDROXYZINE HYDROCHLORIDE 25 MG: 25 TABLET, FILM COATED ORAL at 12:48

## 2025-02-15 RX ADMIN — NICOTINE POLACRILEX 4 MG: 2 GUM, CHEWING BUCCAL at 11:36

## 2025-02-15 RX ADMIN — NICOTINE POLACRILEX 4 MG: 2 GUM, CHEWING BUCCAL at 14:43

## 2025-02-15 ASSESSMENT — ACTIVITIES OF DAILY LIVING (ADL)
HYGIENE/GROOMING: INDEPENDENT
ADLS_ACUITY_SCORE: 15
ORAL_HYGIENE: INDEPENDENT
ADLS_ACUITY_SCORE: 15
DRESS: INDEPENDENT
ADLS_ACUITY_SCORE: 15
ADLS_ACUITY_SCORE: 41
ADLS_ACUITY_SCORE: 15
ADLS_ACUITY_SCORE: 15
LAUNDRY: WITH SUPERVISION
ADLS_ACUITY_SCORE: 15
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
## 2025-02-15 NOTE — PLAN OF CARE
02/15/25 0643   Patient Belongings   Patient Belongings locker   Patient Belongings Put in Hospital Secure Location (Security or Locker, etc.) clothing;shoes   Belongings Search Yes   Clothing Search Yes   Second Staff José Miguel     Goal Outcome Evaluation:    Locker:  Vivek Araiza ([place]). Navy striped v-neck shirt, [employer] sweatshirt, and white Nike Jordans    A               Admission:  I am responsible for any personal items that are not sent to the safe or pharmacy.  [facility] is not responsible for loss, theft or damage of any property in my possession.    Signature:  _________________________________ Date: _______  Time: _____                                              Staff Signature:  ____________________________ Date: ________  Time: _____      2nd Staff person, if patient is unable/unwilling to sign:    Signature: ________________________________ Date: ________  Time: _____     Discharge:  [facility] has returned all of my personal belongings:    Signature: _________________________________ Date: ________  Time: _____                                          Staff Signature:  ____________________________ Date: ________  Time: _____

## 2025-02-15 NOTE — ED NOTES
"Pt was calm and cooperative , isolative to room throughout the shift, during assessment pt responded \" The only reason I'm here is because of my mom\", pt did not verbalize any more statement. Denies SI/SIB/HI/AVH. Pt had no scheduled medication this shift but requested for nicorette gum x 2. Pt was accepted to go to Unit 20 under Dr Sawyer, unit 20 called for report, per charge nurse they will call for report when they are ready. Pt currently resting awake in his room.   "

## 2025-02-15 NOTE — PLAN OF CARE
Problem: Sleep Disturbance  Goal: Adequate Sleep/Rest  Outcome: Progressing   Goal Outcome Evaluation:  Patient went to sleep after the admission interview. No acute issues after going to bed. Requested and receive Nicotine gum and went to sleep after. No behavioral issues. Patient remained in own room. 15 minute checks in place. Appears to have slept for 3.75 hours.

## 2025-02-15 NOTE — PLAN OF CARE
"Problem: Psychotic Signs/Symptoms  Goal: Improved Behavioral Control (Psychotic Signs/Symptoms)  Outcome: Progressing   Goal Outcome Evaluation:  BP (!) 162/96 (BP Location: Left arm, Patient Position: Sitting, Cuff Size: Adult Regular)   Pulse 86   Temp 98.5  F (36.9  C) (Oral)   Resp 18   Ht 1.753 m (5' 9\")   Wt 72.8 kg (160 lb 6.4 oz)   SpO2 96%   BMI 23.69 kg/m    Pt presented as calm and cooperative with cares. Affect was neutral. Pt reported that he slept fairly ok.... stated that \"anxiety is always with him\". He also endorsed mild depression and left knee pain. Denied SI/SIB/HI and A/V hallucinations. Pt received prn hydroxyzine 25 mg for anxiety and Tylenol 650 mg for the left knee pain and comfort with partial relief. He also utilized prn Nicotine gum every hour for smoking cessation. Pt is medication compliant, denied any adverse reactions. His food and fluids intake is adequate. No behavior concerns during the shift.   "

## 2025-02-15 NOTE — ED NOTES
Patient accepted to Station 20. Ready for transfer from the [facility]. Transfer instructions discussed. Pleasant, calm and cooperative. No acute risk of harm to self or others. Denies suicidal/homicidal ideations and/or hallucinations. Verbally committed to safety.  No inappropriate behavior. VSS--see flowsheet. Report given to Staff at Station 20. All belongings to be sent with patient. Would be leaving in a w/c with staff escort.
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
## 2025-02-15 NOTE — H&P
"  ----------------------------------------------------------------------------------------------------------  [facility]   Psychiatry History and Physical    Name: Brodie Carnes   MRN#: [id number]  Age: 39 year old YOB: 1985    Date of Admission: 2/13/2025  Attending Physician: Dr Sawyer     Contacts:     Primary Outpatient Psychiatrist: None  Primary Physician: Yamileth Ref-Primary, Physician  Therapist: None  [facility] : CARLOZ  Probation/: CARLOZ  Family Members: Nia Carnes, Mother,[phone]     Chief Concern:     \"It's between me and my parents\"     History of Present Illness:     Brodie Carnes is a 39 year old male with previous psychiatric diagnoses of schziphrenia vs bipolar vs personality admitted from the ER on 02/15/2025 due to concern for HI and aggression in the context of psychosocial stressors and medication non-adherence.    [facility]/DEC Assessment:    Referral Data and Chief Complaint  Brodie Carnes presents to the ED via police. Patient is presenting to the ED for the following concerns: Significant behavioral change (Homicidal Ideation). Factors that make the mental health crisis life threatening or complex are: Pt presents to the ED via EMS after his mother called the police several times earlier today due to pt making homicidal statements towards her. Per mother, pt has been staying in various hotels and calling his mother daily, telling her that she is crazy, among other insults. Things escalated today when pt called his mother at 6 AM and told her that he was going to kill her by setting her house on fire while she is in it. Pt then showed up at mother's home but was never let inside. When PD arrived, pt agreed to go to the hospital via EMS voluntarily. Upon assessment, pt denies these reported events and states, \"I would never do anything like that to my mother, I love her very much.\" Pt continues to perseverate on " "his belief that his mother is mentally ill. Pt states, \"My mother brought me here, it's not the first time she's done this. She is the crazy one. She should be sitting in this chair, and not me.\" Pt also states that he has been living with his parents which has been stressful due to conflict with his mother, which is incongruent with mother's report. Pt does endorse worsening anxiety and describes it as, \"Feeling horrible, over thinking, trying too hard, never being good enough.\" Pt also states that he has seasonal depression. Pt denies feeling paranoid or violent towards others. Pt denies SI, HI, SIB, AH/VH. Pt endorses daily alcohol use but doesn't disclose how much. Pt starts crying when asked about hx of suicide attempts, dramatically changing his affect. Pt then states, \"That version of Brodie was a different person. I'm a warrior, I'm confident now.\" Pt reports recent stressor of injuring his leg; Pt states, \"I got myself into some trouble when I was living at the hotel,\" and reports not having any money, claiming his mother stole from him. Pt denies being on medications.    History of the Crisis   Brodie carries previous diagnoses of Bipolar 1 disorder, Schizophrenia, NURY, and Narcissistic personality disorder, per chart. Per collateral information from mother, pt has been calling her daily telling her insults, but she has never heard him make a homicidal statement until today. Pt has also recently shaved his head, which is not normal for him.   Pt has hx of [facility] admissions, most recently in December of 2024 at [facility]. Pt was also hospitalized in 2022 following a suicide attempt of cutting his neck. At this time, pt was also presenting with paranoia and disorganized thinking. Parents note that pt has had difficulty functioning since this attempt in 2022. Pt is prescribed medication and was referred to outpatient psychiatry in December of 2024 but has not followed up with referral or taken medications as " "prescribed. Pt has hx of civil commitment that ended in 2023. Pt reports being unemployed and staying in various hotels. Pt reports lack of social support      Collateral information name, relationship, phone number:  Nia Carnes, Mother,[phone]. Duty to warn is not needed due to pt making threat directly to mother.      \"He has bipolar and schizophrenia. He called me at six in the morning, \"You bitch, I will kill you, bring me money.\" and then he called my . I went to [facility] and filed a report. They have it all on file. He came to the house, I saw him, and then I called police. They brought him to the ER, but I don't know if he went willingly. He stayed for three days in the hospital about a month and a half ago at [place].\"      \"He's been very angry. He is not the Brodie that I know. He shaved his head. I hear from him almost everyday, tells me \"You bitch.\" I try to help him, I try everything, try to get him to go to the hospital, I do his shopping. He was staying in hotels. I don't know what triggered this. He has the same issue two years ago, we called the police, he was admitted to the hospital. He was trying to commit suicide, and since then, he has been drinking alcohol everyday. Not taking the medications he is prescribed. He is in denial and doesn't think he is sick. He knows how to play people, he knows how to play the game well. He is unbelievably different individual.        ED/Hospital Course:  Brodie Carnes was medically cleared for admission to inpatient psychiatric unit. In the ED, upon arrival patient had a labile affect and was unwilling to contribute to history.      Patient interview:  He basically endorses the above history, through the lens of his perspective on it, which is that everything was blown out of proportion and his parents are lying.    He notes that regarding his mom, \"she was here sometimes too, you can look her up\". \"She has high anxiety, always all my " "life.\"    He feels like the major triggers towards this admission were staying in hotels rather than at home, and thus being away from his stuff. He notes a high degree of family conflict for baseline, e.g. that his father kicked him and his mother out of the house, they were staying with grandparents, and Brodie got tired of this and thus went to hotel. The challenge is that parents are protective payees for his SSDI, hence there are frequent opportunities for conflict over how that income is managed.    He agrees that he was brought here by ambulance bc \"I made a little threat\". He acknowledges that this was not a good idea.He denies current HI and SI. Stating that he wouldn't hurt his mom \"because she  is my mom\".     Denies impulsive spending, \"the biggest thing was a PS5, and my mom bought that for me, and it was on Black Friday, it was my present to myself\".    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 States that he is looking for jobs, wants to become a  so he can get a housing.    Discussing past meds, \"none of them did something for me, none of them.\" We reviewed a period in 2023 when his notes suggest doing well on 4mg risperidone, \"no, I was depressed, that's why I wasn't getting into fights\". He is willing to continue with escitalopram. He did not like risperidone's level of sedation.He feels that anxiety/stress are more of his problem than irritability, and therefore would prefer to continue with SSRI because that targets anxiety.    Reports daily alcohol use. He denies withdrawal sx and past withdrawal seizure.       Psychiatric Review of Systems:     Depressive:   Reports \"Up and down\" mood. No clear " "depressive sx.   Denies suicidal ideation, low energy, insomnia, hypersomnia, and appetite changes     Dysregulation:    Reports Endorses emotional dsyrgeulation; he feels this is his parents' fault primarily because they trigger him. impulsive, and irritable      Psychosis:    Reports none   Denies Explicitlty denies hallucinations, delusions, paranoia.  Emily:    Reports grandiosity   Denies decreased sleep need, increased activity, distractibility , racing thoughts, excessive pleasure seeking, and excessive risk taking  Cluster B:   Reports affect dysregulation, difficulty regulating mood, blaming others, and poor distress tolerance       Medical Review of Systems:     The Review of Systems is negative other than what is noted in the HPI.     Psychiatric History:     Prior diagnoses: Previous psychiatric diagnoses include schizophrenia, bipolar 1, narcissistic PD.     Hospitalizations: Multiple, for agitation, SIB, suicide attempt, . Most recent hospitalization was at [facility] in 12/19/2024 -12/23/2024 for SI  Court Commitments:  4/05/2022 - [facility] Commitment with Lance Order, for SA/SIB ended on 10/01/2022     Suicide attempts: self-inflicted laceration to left neck in 03/ 2022. \"I don't even known why, I didn't want to die, I did want to hurt myself.\"    Self-injurious behavior: None per Chart Review..     Violence towards others: None per patient report and chart review..     ECT/TMS: None per Chart Review..    Past medications:   Per Chart Review.: risperidone 3/22- 4/23. , olanzapine, lorazepam, buspar, trazodone escitalopram     Substance Use History:     Alcohol:  Drinks every day, \"I don't get drunk every day though\", \"I'm not out there taking shots.\" Endorses drinking to cope with family stress. \"I stick to beers and seltzers\", usually a 6-pack/day. States used to drink more. Hx of DUI in 2013     Nicotine: \"Occasionally\"     Cannabis: Maybe 1x/wk. smoked    Illicit Substances: Denies  " "current or past addiction to cocaine, stimulants, ecstasy, methamphetamine, and opiates/heroin    Chemical Dependency Treatment: Denies history of chemical dependency treatment      Social History:     Upbringing: Per chart, [place], and moved to [place] at age 5. Pt was raised by both parents and grandparents. Pt is an only child. .     Family/Relationships: Single    Living Situation: Somewhat unstable, was in hotels, before that with parents.    Education: Highest level of education obtained is: Some College    Occupation: Currently between jobs; has worked various financial services positions.     Legal:   Per chart, underage drinking back in 2/2006 and a DWI in 3/2013, possession of drug paraphernalia in 10/2017.   Denies history of violence     Guns: no    Abuse/Trauma: Per chart, Denies history of childhood trauma      Service: None     Hobbies/Interests: Basketball      Past Medical/Surgical History:     Knee pain:   Has a left knee that may have a torn meniscus from basketball, got an MRI at [facility], is waiting for results.      Denies history of: hepatitis, HIV, head trauma with or without loss of consciousness, and seizures  Past Medical History:   Diagnosis Date    Schizophrenia (H)          History reviewed. No pertinent surgical history.     Family History:     Psychiatric Family Hx: Per his report, confirms below  Family History   Problem Relation Age of Onset    Bipolar Disorder Mother         Allergies:      No Known Allergies     Medications:     Medications Prior to Admission   Medication Sig Dispense Refill Last Dose/Taking    escitalopram (LEXAPRO) 5 MG tablet Take 1 tablet by mouth daily.   More than a month       See current inpatient medications below.     Vitals and Physical Exam:     BP (!) 162/96 (BP Location: Left arm, Patient Position: Sitting, Cuff Size: Adult Regular)   Pulse 86   Temp 98.5  F (36.9  C) (Oral)   Resp 18   Ht 1.753 m (5' 9\")   Wt 72.8 kg (160 lb " "6.4 oz)   SpO2 96%   BMI 23.69 kg/m      See ED assessment note by ED physician on 2/13.     Labs and Imaging:     Recent Results (from the past 72 hours)   Urine Drug Screen Panel    Collection Time: 02/13/25  7:22 PM   Result Value Ref Range    Amphetamines Urine Screen Negative Screen Negative    Barbituates Urine Screen Negative Screen Negative    Benzodiazepine Urine Screen Negative Screen Negative    Cannabinoids Urine Screen Positive (A) Screen Negative    Cocaine Urine Screen Negative Screen Negative    Fentanyl Qual Urine Screen Negative Screen Negative    Opiates Urine Screen Negative Screen Negative    PCP Urine Screen Negative Screen Negative        Mental Status Examination:     Oriented to:  Person/Self, Situation, Year, and Month  General:  Alert  Appearance:  appears stated age, Grooming is adequate, Dressed in sweatshirt and hat, Hair is shaved, and appropriate weight  Behavior/Attitude:  Cooperative, Engaged, and Minimizing, somewhat perseverative on financial topics  Eye Contact: Appropriate  Psychomotor: No evidence of tics, dystonia, or tardive dyskinesia  no catatonia present  Speech:  appropriate volume/tone, with good articulation, and interruptible, takes pauses in conversation  Language: Fluent in English with appropriate syntax and vocabulary.  Mood:  \"Up and down but I try to stay positive\"  Affect:   lightly irritable/labile, but often smiles, often bright, appropriate, and congruent with mood,  Thought Process:  Lightly perseverative on financial topics/conflicts with parents, but redirectable, tracks interview  Thought Content:   Deies current SI/HI/hallucinations; No apparent delusions  Associations:  Generally linear and appropriate  Insight:  Fair due to not fully accepting his role in his current situation  Judgment:  fair due to some limits on impulse control, aggression  Impulse control: partial  Attention Span:  grossly intact  Concentration:  grossly intact  Recent and Remote " Memory:  not formally assessed  Fund of Knowledge: average  Muscle Strength and Tone: normal  Gait and Station: Normal     Psychiatric Assessment:     Brodie Carnes is a 39 year old male previously diagnosed with psychotic disorder vs bipolar disorder vs personality disorder, and alcohol use disorder, with multiple prior psychiatric hospitalizations, most recently in 12/2024 for suicidal ideation was brought to the emergency ED by EMS after making homicidal threats toward his mother, reportedly due to financial disputes.    Chart review and patient history reveal a longstanding pattern of emotional dysregulation, poor frustration tolerance, affective instability,impulsivity, grandiosity, and recurrent interpersonal conflicts. Over the past several weeks, he has experienced worsening mood instability and irritability, likely exacerbated by ongoing family conflict, housing instability, and financial stressors.    During evaluation, the patient was calm, pleasant,superficially cooperative, and was minimizing symptoms. His affect, attitude and behavior seemed to be significantly different than ED presentation (labile, uncooperative, agitated). His thought process was linear and mostly goal-directed, with a preoccupation with financial concerns. He denied HI and SI. He exhibited limited insight, grandiosity, somewhat an increased sense of entitlement, and externalization of blame.    Currently patient does not appear to be grossly psychotic, manic or depressed. He doesn't seem intoxicated or in withdrawal. While he did not present with overt psychotic symptoms, prior records indicate past concerns for paranoia and disorganization leading to self-inflicted injury to the neck in 2022, requiring hospitalization with commitment and Lance order. It remains unclear whether mood episode, or substance use played a role at the time.     Definitive diagnosis for the current presentation remains unclear th this time,  differential considerations include personality disorder, most notably narcissistic personality disorder, however diagnosing a personality disorder requires prolonged observation in a more stable setting. Other differentials are adjustment disorder with mixed disturbance of emotions and conduct, depressive disorder with mixed features; and bipolar spectrum disorder.    Discussed medication options. Even though, per chart, Risperdal seemed effective in the past,  patient is not interested in restarting Risperdal (caused sedation). Patient finds Lexapro (pta dose 10mg) helpful. Lexapro was increased to 15 mg at the ED. We will continue with Lexapro 15 mg. He would benefit from mood stabilizer to address affective instability, irritably and emotional dysregulation. We will defer to the primary team or outpatient psychiatry team to discuss this as patient is not interested at this time.     Outpatient psychiatry referral recommended upon discharge. Additionally, substance use must be carefully evaluated, as it can exacerbate mood and personality-related symptoms.    Given that he currently has emotional dysregulation, irritability, and homicidal threats, patient warrants inpatient psychiatric hospitalization to maintain his safety.      Psychiatric Plan by Diagnosis      # Unclear diagnosis with features of interpersonal hostility, some impulsivity  R/o narcissistic personality disorder  R/o adjustment disorder with mixed disturbance of emotions and conduct vs depressive disorder with mixed features vs bipolar disorder    1. Medications:  - Continue escitalopram at raised level of 15mg  daily      2. Pertinent Labs/Monitoring:   - UDS pos for cannabis  - EKG pending     3. Additional Plans:  - Patient will be treated in therapeutic milieu with appropriate individual and group therapies as described  - Discharge planning, establishing outpatient mental health services  -Safety and risk assessment upon discharge          "Psychiatric Hospital Course:      Brodie Carnes was admitted to [facility] as a voluntary patient.     Medications:  PTA Lexapro 10 mg was increased to 15 mg at the ED.      The risks, benefits, alternatives, and side effects were discussed and understood by the patient.     Medical Assessment and Plan     Medical diagnoses to be addressed this admission:    # L knee pain   - He had MRI at [facility] a week ago and cites he's still waiting for the results   - Tylenol PRN for pain control.         Medical course: Patient was physically examined by the ED prior to being transferred to the unit and was found to be medically stable and appropriate for admission.     Consults:  none     Checklist     Legal Status: Orders Placed This Encounter      Voluntary      Safety Assessment:   Behavioral Orders   Procedures    Code 1 - Restrict to Unit    [facility] Extended [facility]     Until discharge, Extended Care to offer psychotherapeutic services to mental health patients boarding for admission or stabilization. These services are to include but are not limited to: individual psychotherapy, diagnostic assessment, case management and care planning, safety planning, etc. This may include up to 1 visit per day. If patient is physically located at [facility] or [facility], group psychotherapy up to 2 time per day may be offered.     Routine Programming     As clinically indicated    Status 15     Every 15 minutes.       Risk Assessment:  Risk for harm is moderate.  Risk factors: maladaptive coping, substance use, family history, impulsive, and past behaviors  Protective factors: family     SIO: no    Dispo: TBD. Disposition pending clinical stabilization, medication optimization and development of an appropriate discharge plan.     Attestations:     Patient was seen and discussed with attending physician, Dr Mondragon , who agrees with the assessment and plan.       Gonzalo \"Lamar\" MD Georgette  PGY 2, Psychiatry Resident "         Physician Attestation   I saw this patient with the resident and agree with the resident/fellow's findings and plan of care as documented in the note.  I edited that note directly.    Key findings:   The diagnosis is profoundly unclear here. He does not seem floridly manic, in the sense of not being distractable, clearly grandiose, or having loosening of associations.   On the other hand, hypomania CAN look lightly narcissistic and minimizing, so that is not completely off the differential.  He also does not seem clearly psychotic, in the sense that I cannot find a history in the chart of hallucinations, paranoia beyond these complex interpersonal dynamics. He also does not have negative sx that I would associate with schizophrenia, e.g. he is able to manipulate complex information about the future such as the SSDI process, how he might get refills.  The differential in my mind remains open between hypomania and personality pathology (likely cluster B). Something neurodevelopmental is not off the table either, given the family history and apparent lack of concern for consequences; both ADHD and ASD can look like this.      Please see A&P for additional details of medical decision making.   MINUTES SPENT BY ME on the date of service doing chart review, history, exam, documentation & further activities per the note.          Nathan Mondragon MD  Date of Service (when I saw the patient): 2/15/2025

## 2025-02-16 PROCEDURE — 124N000002 HC R&B MH UMMC

## 2025-02-16 PROCEDURE — 250N000013 HC RX MED GY IP 250 OP 250 PS 637

## 2025-02-16 PROCEDURE — 93005 ELECTROCARDIOGRAM TRACING: CPT

## 2025-02-16 PROCEDURE — 250N000013 HC RX MED GY IP 250 OP 250 PS 637: Performed by: PSYCHIATRY & NEUROLOGY

## 2025-02-16 PROCEDURE — 250N000013 HC RX MED GY IP 250 OP 250 PS 637: Performed by: FAMILY MEDICINE

## 2025-02-16 RX ADMIN — NICOTINE POLACRILEX 4 MG: 2 GUM, CHEWING BUCCAL at 16:09

## 2025-02-16 RX ADMIN — HYDROXYZINE HYDROCHLORIDE 25 MG: 25 TABLET, FILM COATED ORAL at 08:06

## 2025-02-16 RX ADMIN — IBUPROFEN 600 MG: 600 TABLET, FILM COATED ORAL at 14:39

## 2025-02-16 RX ADMIN — OLANZAPINE 10 MG: 10 TABLET, FILM COATED ORAL at 11:18

## 2025-02-16 RX ADMIN — IBUPROFEN 600 MG: 600 TABLET, FILM COATED ORAL at 08:02

## 2025-02-16 RX ADMIN — NICOTINE POLACRILEX 4 MG: 2 GUM, CHEWING BUCCAL at 11:18

## 2025-02-16 RX ADMIN — NICOTINE POLACRILEX 4 MG: 2 GUM, CHEWING BUCCAL at 12:57

## 2025-02-16 RX ADMIN — NICOTINE POLACRILEX 4 MG: 2 GUM, CHEWING BUCCAL at 14:39

## 2025-02-16 RX ADMIN — NICOTINE POLACRILEX 4 MG: 2 GUM, CHEWING BUCCAL at 10:14

## 2025-02-16 RX ADMIN — ACETAMINOPHEN 1000 MG: 500 TABLET ORAL at 14:39

## 2025-02-16 RX ADMIN — OLANZAPINE 10 MG: 10 TABLET, FILM COATED ORAL at 16:07

## 2025-02-16 RX ADMIN — NICOTINE POLACRILEX 4 MG: 2 GUM, CHEWING BUCCAL at 12:11

## 2025-02-16 RX ADMIN — ACETAMINOPHEN 1000 MG: 500 TABLET ORAL at 08:02

## 2025-02-16 RX ADMIN — NICOTINE POLACRILEX 4 MG: 2 GUM, CHEWING BUCCAL at 18:26

## 2025-02-16 RX ADMIN — ESCITALOPRAM OXALATE 15 MG: 5 TABLET, FILM COATED ORAL at 08:02

## 2025-02-16 RX ADMIN — HYDROXYZINE HYDROCHLORIDE 25 MG: 25 TABLET, FILM COATED ORAL at 13:13

## 2025-02-16 RX ADMIN — NICOTINE POLACRILEX 4 MG: 2 GUM, CHEWING BUCCAL at 08:02

## 2025-02-16 ASSESSMENT — ACTIVITIES OF DAILY LIVING (ADL)
ADLS_ACUITY_SCORE: 15
DRESS: INDEPENDENT
ADLS_ACUITY_SCORE: 15
ORAL_HYGIENE: INDEPENDENT
ADLS_ACUITY_SCORE: 15
HYGIENE/GROOMING: INDEPENDENT
ADLS_ACUITY_SCORE: 15
LAUNDRY: WITH SUPERVISION
ADLS_ACUITY_SCORE: 15

## 2025-02-16 NOTE — PLAN OF CARE
Goal Outcome Evaluation:    Problem: Psychotic Signs/Symptoms  Goal: Improved Mood Symptoms (Psychotic Signs/Symptoms)  Outcome: Progressing     Pt presents calm, cooperative, and pleasant. Pt denies SI/HI/SIB, hallucinations, and depression but endorses anxiety. Pt split time between room and milieu, but spent much of shift in lounge watching television or interacting with peers and staff.     Pt did not report concerns with bowel/bladder. Pt reports pain in left knee 8/10. Pt received PRN Tylenol and ibuprofen but doesn't know if this helped much but explained that walking on it has helped. Pt also received PRN nicotine, hydroxyzine, and Zyprexa.   VSS -- BP was elevated but did not meet criteria for notifying provider, medication compliant, behaviorally in control throughout shift.       Problem: Falls - Risk of  Goal: *Absence of Falls  Description  Document Cielo Steph Fall Risk and appropriate interventions in the flowsheet. Outcome: Progressing Towards Goal     Problem: Patient Education: Go to Patient Education Activity  Goal: Patient/Family Education  Outcome: Progressing Towards Goal     Problem: Patient Education: Go to Patient Education Activity  Goal: Patient/Family Education  Outcome: Progressing Towards Goal     Problem: Nutrition Deficit  Goal: *Optimize nutritional status  Outcome: Progressing Towards Goal     Problem: Risk for Spread of Infection  Goal: Prevent transmission of infectious organism to others  Description  Prevent the transmission of infectious organisms to other patients, staff members, and visitors. Outcome: Progressing Towards Goal     Problem: Patient Education:  Go to Education Activity  Goal: Patient/Family Education  Outcome: Progressing Towards Goal     Problem: Pressure Injury - Risk of  Goal: *Prevention of pressure injury  Description  Document Nir Scale and appropriate interventions in the flowsheet.   Outcome: Progressing Towards Goal     Problem: Patient Education: Go to Patient Education Activity  Goal: Patient/Family Education  Outcome: Progressing Towards Goal

## 2025-02-16 NOTE — PLAN OF CARE
Problem: Anxiety  Goal: Anxiety Reduction or Resolution  Outcome: Progressing     Problem: Psychotic Signs/Symptoms  Goal: Improved Behavioral Control (Psychotic Signs/Symptoms)  Outcome: Progressing   Goal Outcome Evaluation:    Plan of Care Reviewed With: patient        Pt is isolative and withdrawn to self, no social interaction with peers and staff, he was on the lounge watching television with peers but keeping to himself, he's alert and able to verbalize needs well. Upon the writer doing the assessment, pt was agitated, when asking more MHA pt got abusive to the writer, prn Zyprexa was given, declined answering the rest of MH assessment. In about 10 minutes after Zyprexa administration pt came to ask again for medication after redirection that he got the medication he was again abusive. Pt intake is adequate, hygiene is ok

## 2025-02-16 NOTE — PLAN OF CARE
Problem: Sleep Disturbance  Goal: Adequate Sleep/Rest  Outcome: Progressing   Goal Outcome Evaluation:  Patient had an uneventful night. Appears to have slept for 6.75 hours. No requests for prn's thus far tonight. No complaints of pain or discomfort. Respirations regular and non-labored. Safety checks in place q 15 minutes, no immediate concerns.

## 2025-02-16 NOTE — PLAN OF CARE
Problem: Adult Inpatient Plan of Care  Goal: Plan of Care Review  Description: The Plan of Care Review/Shift note should be completed every shift.  The Outcome Evaluation is a brief statement about your assessment that the patient is improving, declining, or no change.  This information will be displayed automatically on your shift  note.  Outcome: Progressing     Problem: Anxiety  Goal: Anxiety Reduction or Resolution  Outcome: Progressing   Goal Outcome Evaluation:    Pt presented with flat and blunted affect, visible in the milieu, isolative to himself, pt was not engaging to anybody was keeping to himself. Pt C/O left knee pain, rated 5/10 PRN Tylenol was given with some effect. Pt was agitated requested for anxiety PRN Zyprexa was given per pt request, denied SI, SIB, HI, denied A/V/Hallucinations and contracted for safety. Pt running high blood pressure, dr qiu, pt compliant with medication, hygiene is ok, no safety concerns or behavior issues  PRNs Tylenol, Zyprexa and Hydroxyzine

## 2025-02-17 VITALS
BODY MASS INDEX: 24.26 KG/M2 | WEIGHT: 163.8 LBS | HEIGHT: 69 IN | SYSTOLIC BLOOD PRESSURE: 150 MMHG | OXYGEN SATURATION: 97 % | DIASTOLIC BLOOD PRESSURE: 97 MMHG | RESPIRATION RATE: 20 BRPM | HEART RATE: 60 BPM | TEMPERATURE: 98.4 F

## 2025-02-17 LAB
ATRIAL RATE - MUSE: 72 BPM
DIASTOLIC BLOOD PRESSURE - MUSE: NORMAL MMHG
INTERPRETATION ECG - MUSE: NORMAL
P AXIS - MUSE: 38 DEGREES
PR INTERVAL - MUSE: 142 MS
QRS DURATION - MUSE: 76 MS
QT - MUSE: 416 MS
QTC - MUSE: 455 MS
R AXIS - MUSE: -23 DEGREES
SYSTOLIC BLOOD PRESSURE - MUSE: NORMAL MMHG
T AXIS - MUSE: 16 DEGREES
VENTRICULAR RATE- MUSE: 72 BPM

## 2025-02-17 PROCEDURE — 99238 HOSP IP/OBS DSCHRG MGMT 30/<: CPT | Mod: GC | Performed by: PSYCHIATRY & NEUROLOGY

## 2025-02-17 PROCEDURE — 250N000013 HC RX MED GY IP 250 OP 250 PS 637

## 2025-02-17 PROCEDURE — 250N000013 HC RX MED GY IP 250 OP 250 PS 637: Performed by: FAMILY MEDICINE

## 2025-02-17 PROCEDURE — 250N000013 HC RX MED GY IP 250 OP 250 PS 637: Performed by: PSYCHIATRY & NEUROLOGY

## 2025-02-17 RX ORDER — ESCITALOPRAM OXALATE 5 MG/1
15 TABLET ORAL DAILY
Qty: 90 TABLET | Refills: 0 | Status: SHIPPED | OUTPATIENT
Start: 2025-02-18 | End: 2025-03-20

## 2025-02-17 RX ORDER — HYDROXYZINE HYDROCHLORIDE 25 MG/1
25 TABLET, FILM COATED ORAL 3 TIMES DAILY PRN
Qty: 60 TABLET | Refills: 0 | Status: SHIPPED | OUTPATIENT
Start: 2025-02-17 | End: 2025-03-19

## 2025-02-17 RX ADMIN — ESCITALOPRAM OXALATE 15 MG: 5 TABLET, FILM COATED ORAL at 08:01

## 2025-02-17 RX ADMIN — NICOTINE POLACRILEX 4 MG: 2 GUM, CHEWING BUCCAL at 10:45

## 2025-02-17 RX ADMIN — OLANZAPINE 10 MG: 10 TABLET, FILM COATED ORAL at 11:58

## 2025-02-17 RX ADMIN — NICOTINE POLACRILEX 4 MG: 2 GUM, CHEWING BUCCAL at 12:17

## 2025-02-17 RX ADMIN — HYDROXYZINE HYDROCHLORIDE 25 MG: 25 TABLET, FILM COATED ORAL at 10:20

## 2025-02-17 RX ADMIN — IBUPROFEN 600 MG: 600 TABLET, FILM COATED ORAL at 08:03

## 2025-02-17 RX ADMIN — NICOTINE POLACRILEX 4 MG: 2 GUM, CHEWING BUCCAL at 09:34

## 2025-02-17 RX ADMIN — ACETAMINOPHEN 1000 MG: 500 TABLET ORAL at 08:03

## 2025-02-17 ASSESSMENT — ACTIVITIES OF DAILY LIVING (ADL)
ADLS_ACUITY_SCORE: 15
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
## 2025-02-17 NOTE — DISCHARGE SUMMARY
"                                                                                                                 ----------------------------------------------------------------------------------------------------------  [facility]   Psychiatric Discharge Summary      Brodie Carnes MRN# [id number]   Age: 39 year old YOB: 1985     Date of Admission:  2/13/2025  Date of Discharge:  2/17/2025  Admitting Physician:  Ector Sawyer MD  Discharge Physician:  Ector Sawyer MD    This document serves as a transfer of care to Brodie Carnes's outpatient providers.     Events Leading to Hospitalization:     Brodie Carnes is a 39 year old male with previous psychiatric diagnoses of schizophrenia vs bipolar vs personality admitted from the ER on 02/15/2025 due to concern for HI and aggression in the context of psychosocial stressors and medication non-adherence.     [facility]/DEC Assessment:   \"Referral Data and Chief Complaint  Brodie Carnes presents to the ED via police. Patient is presenting to the ED for the following concerns: Significant behavioral change (Homicidal Ideation). Factors that make the mental health crisis life threatening or complex are: Pt presents to the ED via EMS after his mother called the police several times earlier today due to pt making homicidal statements towards her. Per mother, pt has been staying in various hotels and calling his mother daily, telling her that she is crazy, among other insults. Things escalated today when pt called his mother at 6 AM and told her that he was going to kill her by setting her house on fire while she is in it. Pt then showed up at mother's home but was never let inside. When PD arrived, pt agreed to go to the hospital via EMS voluntarily. Upon assessment, pt denies these reported events and states, \"I would never do anything like that to my mother, I love her very much.\" Pt continues to perseverate on " "his belief that his mother is mentally ill. Pt states, \"My mother brought me here, it's not the first time she's done this. She is the crazy one. She should be sitting in this chair, and not me.\" Pt also states that he has been living with his parents which has been stressful due to conflict with his mother, which is incongruent with mother's report. Pt does endorse worsening anxiety and describes it as, \"Feeling horrible, over thinking, trying too hard, never being good enough.\" Pt also states that he has seasonal depression. Pt denies feeling paranoid or violent towards others. Pt denies SI, HI, SIB, AH/VH. Pt endorses daily alcohol use but doesn't disclose how much. Pt starts crying when asked about hx of suicide attempts, dramatically changing his affect. Pt then states, \"That version of Brodie was a different person. I'm a warrior, I'm confident now.\" Pt reports recent stressor of injuring his leg; Pt states, \"I got myself into some trouble when I was living at the hotel,\" and reports not having any money, claiming his mother stole from him. Pt denies being on medications.     History of the Crisis   Brodie carries previous diagnoses of Bipolar 1 disorder, Schizophrenia, NURY, and Narcissistic personality disorder, per chart. Per collateral information from mother, pt has been calling her daily telling her insults, but she has never heard him make a homicidal statement until today. Pt has also recently shaved his head, which is not normal for him.   Pt has hx of [facility] admissions, most recently in December of 2024 at [facility]. Pt was also hospitalized in 2022 following a suicide attempt of cutting his neck. At this time, pt was also presenting with paranoia and disorganized thinking. Parents note that pt has had difficulty functioning since this attempt in 2022. Pt is prescribed medication and was referred to outpatient psychiatry in December of 2024 but has not followed up with referral or taken medications " "as prescribed. Pt has hx of civil commitment that ended in 2023. Pt reports being unemployed and staying in various hotels. Pt reports lack of social support        Collateral information name, relationship, phone number:  Nia Carnes, Mother,[phone]. Duty to warn is not needed due to pt making threat directly to mother.      \"He has bipolar and schizophrenia. He called me at six in the morning, \"You bitch, I will kill you, bring me money.\" and then he called my . I went to [facility] and filed a report. They have it all on file. He came to the house, I saw him, and then I called police. They brought him to the ER, but I don't know if he went willingly. He stayed for three days in the hospital about a month and a half ago at [facility].\"      \"He's been very angry. He is not the Brodie that I know. He shaved his head. I hear from him almost everyday, tells me \"You bitch.\" I try to help him, I try everything, try to get him to go to the hospital, I do his shopping. He was staying in hotels. I don't know what triggered this. He has the same issue two years ago, we called the police, he was admitted to the hospital. He was trying to commit suicide, and since then, he has been drinking alcohol everyday. Not taking the medications he is prescribed. He is in denial and doesn't think he is sick. He knows how to play people, he knows how to play the game well. He is unbelievably different individual.         See H&P by Nathan Mondragon MD on 2/15/2025 for additional details.      Diagnoses:   Primary Psychiatric Diagnosis  # Unclear diagnosis with features of interpersonal hostility, some impulsivity  R/o narcissistic personality disorder  R/o adjustment disorder with mixed disturbance of emotions and conduct vs depressive disorder with mixed features     Psychiatric Assessment:   Brodie Carnes is a 39 year old male previously diagnosed with psychotic disorder vs bipolar disorder vs personality disorder, and " alcohol use disorder, with multiple prior psychiatric hospitalizations, most recently in 12/2024 for suicidal ideation was brought to the emergency ED by EMS after making homicidal threats toward his mother, reportedly due to financial disputes.     Chart review and patient history revealed a longstanding pattern of emotional dysregulation, poor frustration tolerance, affective instability,impulsivity, grandiosity, and recurrent interpersonal conflicts. Over the previous several weeks, he had experienced worsening mood instability and irritability, likely exacerbated by ongoing family conflict, housing instability, and financial stressors.     During evaluation on admission, the patient was calm, pleasant, superficially cooperative, and was minimizing symptoms. His affect, attitude and behavior seemed to be significantly different than ED presentation (labile, uncooperative, agitated). His thought process was linear and mostly goal-directed, with a preoccupation with financial concerns. He denied HI and SI. He exhibited limited insight, grandiosity, somewhat an increased sense of entitlement, and externalization of blame.     Patient did not appear to be grossly psychotic, manic or depressed. He didn't seem intoxicated or in withdrawal. While he did not present with overt psychotic symptoms, prior records indicate past concerns for paranoia and disorganization leading to self-inflicted injury to the neck in 2022, requiring hospitalization with commitment and Lance order. It remains unclear whether mood episode, or substance use played a role at the time.      Definitive diagnosis for the presentation remained unclear, differential considerations include personality disorder, most notably narcissistic personality disorder, however diagnosing a personality disorder requires prolonged observation in a more stable setting. Other differential included adjustment disorder with mixed disturbance of emotions and conduct,  depressive disorder with mixed features; and bipolar spectrum disorder. Bipolar spectrum illness felt less likely after further questioning 2/17, seeming more likely personality disorder as predominant finding.     Lexapro was increased to 15 mg at the ED. He could benefit from mood stabilizer to address affective instability, irritably and emotional dysregulation. We will defer to the outpatient psychiatry team to discuss this as patient was not interested at this time.      Outpatient psychiatry referral appointment scheduled for 3/17 upon discharge.      Psychiatric Hospital Course:     Brodie Carnes was admitted to [facility] as a voluntary patient. The patient was placed under status 15 (15 minute checks) to ensure patient safety.  Medication Trials and Changes: risks, benefits, alternatives, and side effects were discussed and understood by the patient and other caregivers.  2/16 PTA Lexapro 10 mg was increased to 15 mg  Level of medication adherence: good  Behaviors: The patient was safe and appropriate and did not require chemical/physical restraints during admission. He was cooperative with cares, had good group attendance, and was visible in the milieu.  Change in psychiatric symptoms: Over the course of this hospitalization the patient's symptoms of SI/HI improved.  Collateral information was obtained from parents and notable for labile behavior and threatening to kill them.   Brodie was released to home. At the time of discharge he was determined to not be a danger to himself or others.     Risk Assessment:      Today Brodie Carnes denies HI/SI. Denies intention that he would actually have burned his house down. Denies that he would hurt anyone. Patient has notable risk factors for self-harm, including anxiety and substance abuse. However, risk is mitigated by history of seeking help when needed. Therefore, based on all available evidence including the factors cited above, he does not appear to be at  "imminent risk for self-harm, does not meet criteria for a 72-hr hold, and therefore remains appropriate for ongoing outpatient level of care. Additional steps taken to minimize risk include: medication optimization, close psychiatric follow up and provision of crisis resources. Voluntary referral for outpatient treatment was offered, he declined this offer.     Psychiatric Examination:     Oriented to:  Grossly Oriented  General:  Awake and Alert  Appearance:  appears stated age and Grooming is adequate  Behavior/Attitude:  Calm and Minimizing, irritated when talking about his mom  Eye Contact: Appropriate  Psychomotor: Normal and No evidence of tics, dystonia, or tardive dyskinesia  no catatonia present  Speech:  appropriate volume/tone  Language: Fluent in English with appropriate syntax and vocabulary.  Mood:  \"Alright\"  Affect:  appropriate and congruent with mood  Thought Process:  linear  Thought Content:   No SI/HI/AH/VH; No apparent delusions  Associations:  intact  Insight:  partial due to inconsistent collateral from parents  Judgment:  partial due to joking about homicide and burning joking about down a house  Impulse control: good  Attention Span:  grossly intact  Concentration:  grossly intact  Recent and Remote Memory:  not formally assessed  Fund of Knowledge: average  Muscle Strength and Tone: normal  Gait and Station:  Not formally assessed     Medical Hospital Course:   Brodie Carnes was medically cleared by the ED prior to admission to the unit. PTA medications were continued on admission.     Medical Diagnoses addressed:  Medical diagnoses to be addressed this admission:    # L knee pain   - He had MRI at [facility] a week ago and cites he's still waiting for the results   - Tylenol PRN for pain control.     Consults: None    Labs were notable for the following:  -elevated lipids  -UDS positive for cannabis     Discharge Medications:     Discharge Medication List as of 2/17/2025  " 4:06 PM        START taking these medications    Details   hydrOXYzine HCl (ATARAX) 25 MG tablet Take 1 tablet (25 mg) by mouth 3 times daily as needed for anxiety., Disp-60 tablet, R-0, E-Prescribe           CONTINUE these medications which have CHANGED    Details   escitalopram (LEXAPRO) 5 MG tablet Take 3 tablets (15 mg) by mouth daily., Disp-90 tablet, R-0, E-PrescribePlease adjust to decrease number of pills if possible            Discharge Plan:   Medications as above  Psychiatric Appointments: Psychiatry/Medication Management:  Dr. Jing Camargo: 3/17/25 at 11:20am   3.   Psychotherapy Appointments: Ana Christian MSW:   3/3/25 @ 12:00pm-  Virtual appt  [facility], [facility], [address] [place] [address] [address] [address]   Referrals: None  Medical follow up: Knee MRI from [facility]     Attestations:     Esequiel Jean-Baptiste, MS3  Medical Student    I was present with the medical student who participated in the service and in the documentation of the note.  I have verified the history and personally performed the physical exam and medical decision making. I agree with the assessment and plan of care as documented in the note.    This patient was seen and discussed with my attending physician.  Maria Dolores Arevalo MD   Psychiatry Resident Physician

## 2025-02-17 NOTE — PLAN OF CARE
"Pt denies SI.  Pt requested and rec'd PRN Tylenol 1,000 mg and Ibuprofen 600 mg VA PRN for left knee pain.  Pt reported relief.  Pt denied SI and anxiety.  Though pt requested and rec'd PRN Hydroxyzine 25 mg at 1020.  Pt requested and rec'd PRN Zyprexa 10 mg at 1158 for anxiety and agitation.  Pt reported relief.  Pt states he wants to go home today.  Pt has expressed this to MD's.  Pt observed on the phone.  Social with select peers.  Problem: Adult Inpatient Plan of Care  Goal: Plan of Care Review  Description: The Plan of Care Review/Shift note should be completed every shift.  The Outcome Evaluation is a brief statement about your assessment that the patient is improving, declining, or no change.  This information will be displayed automatically on your shift  note.  Outcome: Not Progressing  Goal: Patient-Specific Goal (Individualized)  Description: You can add care plan individualizations to a care plan. Examples of Individualization might be:  \"Parent requests to be called daily at 9am for status\", \"I have a hard time hearing out of my right ear\", or \"Do not touch me to wake me up as it startles  me\".  Outcome: Not Progressing  Goal: Absence of Hospital-Acquired Illness or Injury  Outcome: Not Progressing  Goal: Optimal Comfort and Wellbeing  Outcome: Not Progressing  Intervention: Monitor Pain and Promote Comfort  Recent Flowsheet Documentation  Taken 2/17/2025 0700 by Kym Khalil RN  Pain Management Interventions: medication (see MAR)  Goal: Readiness for Transition of Care  Outcome: Not Progressing     Problem: Adult Behavioral Health Plan of Care  Goal: Plan of Care Review  Outcome: Not Progressing  Goal: Patient-Specific Goal (Individualization)  Description: You can add care plan individualizations to a care plan. Examples of Individualization might be:  \"Parent requests to be called daily at 9am for status\", \"I have a hard time hearing out of my right ear\", or \"Do not touch me to wake me up as it " "startles  me\".  Outcome: Not Progressing  Goal: Adheres to Safety Considerations for Self and Others  Outcome: Not Progressing  Goal: Absence of New-Onset Illness or Injury  Outcome: Not Progressing  Goal: Optimized Coping Skills in Response to Life Stressors  Outcome: Not Progressing  Goal: Develops/Participates in Therapeutic [place] to Support Successful Transition  Outcome: Not Progressing     Problem: Sleep Disturbance  Goal: Adequate Sleep/Rest  Outcome: Not Progressing     Problem: Anxiety  Goal: Anxiety Reduction or Resolution  Outcome: Not Progressing     Problem: Psychotic Signs/Symptoms  Goal: Improved Behavioral Control (Psychotic Signs/Symptoms)  Outcome: Not Progressing  Goal: Optimal Cognitive Function (Psychotic Signs/Symptoms)  Outcome: Not Progressing  Goal: Increased Participation and Engagement (Psychotic Signs/Symptoms)  Outcome: Not Progressing  Goal: Improved Mood Symptoms (Psychotic Signs/Symptoms)  Outcome: Not Progressing  Goal: Improved Psychomotor Symptoms (Psychotic Signs/Symptoms)  Outcome: Not Progressing  Goal: Decreased Sensory Symptoms (Psychotic Signs/Symptoms)  Outcome: Not Progressing  Goal: Improved Sleep (Psychotic Signs/Symptoms)  Outcome: Not Progressing  Goal: Enhanced Social, Occupational or Functional Skills (Psychotic Signs/Symptoms)  Outcome: Not Progressing   Goal Outcome Evaluation:                        "

## 2025-02-17 NOTE — PLAN OF CARE
"  Problem: Adult Inpatient Plan of Care  Goal: Plan of Care Review  Description: The Plan of Care Review/Shift note should be completed every shift.  The Outcome Evaluation is a brief statement about your assessment that the patient is improving, declining, or no change.  This information will be displayed automatically on your shift  note.  Outcome: Met  Goal: Patient-Specific Goal (Individualized)  Description: You can add care plan individualizations to a care plan. Examples of Individualization might be:  \"Parent requests to be called daily at 9am for status\", \"I have a hard time hearing out of my right ear\", or \"Do not touch me to wake me up as it startles  me\".  Outcome: Met  Goal: Absence of Hospital-Acquired Illness or Injury  Outcome: Met  Goal: Optimal Comfort and Wellbeing  Outcome: Met  Goal: Readiness for Transition of Care  Outcome: Met     Problem: Adult Behavioral Health Plan of Care  Goal: Plan of Care Review  Outcome: Met  Flowsheets (Taken 2/17/2025 1616)  Patient Agreement with Plan of Care: agrees  Goal: Patient-Specific Goal (Individualization)  Description: You can add care plan individualizations to a care plan. Examples of Individualization might be:  \"Parent requests to be called daily at 9am for status\", \"I have a hard time hearing out of my right ear\", or \"Do not touch me to wake me up as it startles  me\".  Outcome: Met  Goal: Adheres to Safety Considerations for Self and Others  Outcome: Met  Intervention: Develop and Maintain Individualized Safety Plan  Recent Flowsheet Documentation  Taken 2/17/2025 1616 by Giorgio Martin RN  Safety Measures:   environmental rounds completed   safety rounds completed  Goal: Absence of New-Onset Illness or Injury  Outcome: Met  Goal: Optimized Coping Skills in Response to Life Stressors  Outcome: Met  Goal: Develops/Participates in Therapeutic [place] to Support Successful Transition  Outcome: Met     Problem: Sleep Disturbance  Goal: Adequate " Sleep/Rest  Outcome: Met     Problem: Anxiety  Goal: Anxiety Reduction or Resolution  Outcome: Met     Problem: Psychotic Signs/Symptoms  Goal: Improved Behavioral Control (Psychotic Signs/Symptoms)  Outcome: Met  Goal: Optimal Cognitive Function (Psychotic Signs/Symptoms)  Outcome: Met  Goal: Increased Participation and Engagement (Psychotic Signs/Symptoms)  Outcome: Met  Goal: Improved Mood Symptoms (Psychotic Signs/Symptoms)  Outcome: Met  Goal: Improved Psychomotor Symptoms (Psychotic Signs/Symptoms)  Outcome: Met  Goal: Decreased Sensory Symptoms (Psychotic Signs/Symptoms)  Outcome: Met  Goal: Improved Sleep (Psychotic Signs/Symptoms)  Outcome: Met  Goal: Enhanced Social, Occupational or Functional Skills (Psychotic Signs/Symptoms)  Outcome: Met   Goal Outcome Evaluation:    Plan of Care Reviewed With: patient

## 2025-02-17 NOTE — PLAN OF CARE
BEH IP Unit Acuity Rating Score (UARS)  Patient is given one point for every criteria they meet.    CRITERIA SCORING   On a 72 hour hold, court hold, committed, stay of commitment, or revocation. 0    Patient LOS on BEH unit exceeds 20 days. 0  LOS: 2   Patient under guardianship, 55+, otherwise medically complex, or under age 11. 0   Suicide ideation without relief of precipitating factors. 0   Current plan for suicide. 0   Current plan for homicide. 1   Imminent risk or actual attempt to seriously harm another without relief of factors precipitating the attempt. 0   Severe dysfunction in daily living (ex: complete neglect for self care, extreme disruption in vegetative function, extreme deterioration in social interactions). 1   Recent (last 7 days) or current physical aggression in the ED or on unit. 0   Restraints or seclusion episode in past 72 hours. 0   Recent (last 7 days) or current verbal aggression, agitation, yelling, etc., while in the ED or unit. 0   Active psychosis. 0   Need for constant or near constant redirection (from leaving, from others, etc).  0   Intrusive or disruptive behaviors. 0   Patient requires 3 or more hours of individualized nursing care per 8-hour shift (i.e. for ADLs, meds, therapeutic interventions). 0   TOTAL 2

## 2025-02-17 NOTE — PLAN OF CARE
Initial meeting note:    Therapist introduced self to patient and discussed psychotherapy service available to patient.     Pt response: Pt unsure if they would like 1:1 session. Will check in with Pt again at a later time.    Plan: Will continue to check in with Pt for 1:1 sessions.

## 2025-02-17 NOTE — PLAN OF CARE
Care Coordinator Note(s):    Care Request(s):   Therapy  Preferences: Time Frame: 2 Weeks, Virtual  Notes: [facility]. Discharging today 2/17 this afternoon.        Care Outcome(s):    CC Progress Note(s)/ Documentation:    Appointment: Therapy  Date/time: Monday March 3rd, 2025 @ 12:00 pm Virtual  Provider:  Asa DAS [facility]  Address: [facility], [address], [address], [place] [address]  Phone: [phone] Fax: [phone]  Note:   Intake paperwork to be completed beforehand and visit link will be sent to  [email or web address].       -Suki Thompson  Adult Behavioral Health Care Coordinator

## 2025-02-17 NOTE — PLAN OF CARE
Occupational Therapy Non-Attendance Note:    Pt has not attended scheduled occupational therapy sessions as of 02/17/2025. Encourage attendance and participation as appropriate.
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
## 2025-02-17 NOTE — PROGRESS NOTES
Alert and oriented, able to communicate needs. Flat, dismissive during assessment, he denied depression, endorsed mild anxiety, denied SI/HI. Belonging returned at discharge time, belonging page signed. AVS reviewed with patient. Discharge medication list reviewed with patient, discharge medication given to patient at discharge time. Accompanied to [facility],  by taxi at 1705.

## 2025-02-17 NOTE — PLAN OF CARE
Problem: Sleep Disturbance  Goal: Adequate Sleep/Rest  Outcome: Progressing   Goal Outcome Evaluation:  Patient had  acute issues during the night. No requests for prn's.. No behavioral issues. Patient remained in own room. 15 minute checks in place. Appears to have slept for 6.75 hours. Respirations regular and  non-labored.

## 2025-02-19 ENCOUNTER — PATIENT OUTREACH (OUTPATIENT)
Dept: CARE COORDINATION | Facility: CLINIC | Age: 40
End: 2025-02-19
Payer: MEDICAID

## 2025-02-19 NOTE — PROGRESS NOTES
Connected Care Resource Center:   Connected Care Resource Center Contact  [facility]/Voicemail     Clinical Data: Post-Discharge Outreach     Outreach attempted x 2.  Unable to LVM- Patient's phone number is associated with a phone that is not in service.       Plan:  [facility] will do no further outreaches at this time.       BRANDEN Gonzalez  [facility] Resource [place], [facility]    *Connected Care Resource Team does NOT follow patient ongoing. Referrals are identified based on internal discharge reports and the outreach is to ensure patient has an understanding of their discharge instructions.

## 2025-03-29 ENCOUNTER — HOSPITAL ENCOUNTER (EMERGENCY)
Facility: CLINIC | Age: 40
Discharge: HOME | End: 2025-03-29
Attending: EMERGENCY MEDICINE
Payer: MEDICAID

## 2025-03-29 VITALS
RESPIRATION RATE: 18 BRPM | SYSTOLIC BLOOD PRESSURE: 129 MMHG | TEMPERATURE: 97.4 F | HEIGHT: 70 IN | WEIGHT: 165 LBS | HEART RATE: 103 BPM | BODY MASS INDEX: 23.62 KG/M2 | DIASTOLIC BLOOD PRESSURE: 83 MMHG | OXYGEN SATURATION: 99 %

## 2025-03-29 ASSESSMENT — COLUMBIA-SUICIDE SEVERITY RATING SCALE - C-SSRS
6. HAVE YOU EVER DONE ANYTHING, STARTED TO DO ANYTHING, OR PREPARED TO DO ANYTHING TO END YOUR LIFE?: NO
2. HAVE YOU ACTUALLY HAD ANY THOUGHTS OF KILLING YOURSELF IN THE PAST MONTH?: NO
1. IN THE PAST MONTH, HAVE YOU WISHED YOU WERE DEAD OR WISHED YOU COULD GO TO SLEEP AND NOT WAKE UP?: NO

## 2025-03-29 ASSESSMENT — ACTIVITIES OF DAILY LIVING (ADL)
ADLS_ACUITY_SCORE: 41
ADLS_ACUITY_SCORE: 41

## 2025-03-29 NOTE — PROGRESS NOTES
The patient requested to sit in the lobby. After being escorted to the lobby, the patient left the premises without further discussion or assistance.

## 2025-03-29 NOTE — ED TRIAGE NOTES
"Pt ambulatory to the ER requesting medication refill. Pt does not know what medication he needs refilled but says he takes it for anxiety. Pt last took the med about 5 days ago.    /83   Pulse 103   Temp 97.4  F (36.3  C) (Oral)   Resp 18   Ht 1.778 m (5' 10\")   Wt 74.8 kg (165 lb)   SpO2 99%   BMI 23.68 kg/m         Triage Assessment (Adult)       Row Name 03/29/25 0309          Triage Assessment    Airway WDL WDL        Respiratory WDL    Respiratory WDL WDL        Skin Circulation/Temperature WDL    Skin Circulation/Temperature WDL WDL        Cardiac WDL    Cardiac WDL WDL        Peripheral/Neurovascular WDL    Peripheral Neurovascular WDL WDL        Cognitive/Neuro/Behavioral WDL    Cognitive/Neuro/Behavioral WDL WDL                     "

## 2025-04-13 ENCOUNTER — HOSPITAL ENCOUNTER (OUTPATIENT)
Facility: CLINIC | Age: 40
Setting detail: OBSERVATION
Discharge: HOME | End: 2025-04-14
Attending: EMERGENCY MEDICINE | Admitting: EMERGENCY MEDICINE
Payer: MEDICAID

## 2025-04-13 DIAGNOSIS — R46.89 AGGRESSIVE BEHAVIOR: ICD-10-CM

## 2025-04-13 PROCEDURE — 80307 DRUG TEST PRSMV CHEM ANLYZR: CPT | Performed by: EMERGENCY MEDICINE

## 2025-04-13 PROCEDURE — 99285 EMERGENCY DEPT VISIT HI MDM: CPT

## 2025-04-13 PROCEDURE — G0378 HOSPITAL OBSERVATION PER HR: HCPCS

## 2025-04-13 PROCEDURE — 99223 1ST HOSP IP/OBS HIGH 75: CPT | Performed by: EMERGENCY MEDICINE

## 2025-04-13 PROCEDURE — 250N000013 HC RX MED GY IP 250 OP 250 PS 637: Performed by: EMERGENCY MEDICINE

## 2025-04-13 RX ORDER — OLANZAPINE 10 MG/1
10 TABLET, ORALLY DISINTEGRATING ORAL
Status: COMPLETED | OUTPATIENT
Start: 2025-04-13 | End: 2025-04-13

## 2025-04-13 RX ORDER — HYDROXYZINE HYDROCHLORIDE 25 MG/1
25 TABLET, FILM COATED ORAL EVERY 6 HOURS PRN
Status: DISCONTINUED | OUTPATIENT
Start: 2025-04-13 | End: 2025-04-14 | Stop reason: HOSPADM

## 2025-04-13 RX ADMIN — HYDROXYZINE HYDROCHLORIDE 25 MG: 25 TABLET, FILM COATED ORAL at 21:07

## 2025-04-13 RX ADMIN — OLANZAPINE 10 MG: 10 TABLET, ORALLY DISINTEGRATING ORAL at 22:03

## 2025-04-13 ASSESSMENT — ACTIVITIES OF DAILY LIVING (ADL)
ADLS_ACUITY_SCORE: 41

## 2025-04-13 ASSESSMENT — COLUMBIA-SUICIDE SEVERITY RATING SCALE - C-SSRS
2. HAVE YOU ACTUALLY HAD ANY THOUGHTS OF KILLING YOURSELF IN THE PAST MONTH?: NO
1. IN THE PAST MONTH, HAVE YOU WISHED YOU WERE DEAD OR WISHED YOU COULD GO TO SLEEP AND NOT WAKE UP?: NO
6. HAVE YOU EVER DONE ANYTHING, STARTED TO DO ANYTHING, OR PREPARED TO DO ANYTHING TO END YOUR LIFE?: NO
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
## 2025-04-14 VITALS
TEMPERATURE: 97.7 F | DIASTOLIC BLOOD PRESSURE: 92 MMHG | HEIGHT: 69 IN | WEIGHT: 165 LBS | OXYGEN SATURATION: 98 % | HEART RATE: 101 BPM | SYSTOLIC BLOOD PRESSURE: 135 MMHG | BODY MASS INDEX: 24.44 KG/M2 | RESPIRATION RATE: 20 BRPM

## 2025-04-14 PROCEDURE — 99254 IP/OBS CNSLTJ NEW/EST MOD 60: CPT | Performed by: PSYCHIATRY & NEUROLOGY

## 2025-04-14 PROCEDURE — G0378 HOSPITAL OBSERVATION PER HR: HCPCS

## 2025-04-14 PROCEDURE — 250N000013 HC RX MED GY IP 250 OP 250 PS 637: Performed by: EMERGENCY MEDICINE

## 2025-04-14 PROCEDURE — 250N000013 HC RX MED GY IP 250 OP 250 PS 637: Performed by: PSYCHIATRY & NEUROLOGY

## 2025-04-14 RX ORDER — ESCITALOPRAM OXALATE 5 MG/1
15 TABLET ORAL DAILY
Qty: 30 TABLET | Refills: 0 | Status: SHIPPED | OUTPATIENT
Start: 2025-04-14

## 2025-04-14 RX ORDER — HYDROXYZINE HYDROCHLORIDE 25 MG/1
50 TABLET, FILM COATED ORAL EVERY 4 HOURS PRN
COMMUNITY

## 2025-04-14 RX ADMIN — HYDROXYZINE HYDROCHLORIDE 25 MG: 25 TABLET, FILM COATED ORAL at 09:33

## 2025-04-14 RX ADMIN — ESCITALOPRAM OXALATE 15 MG: 5 TABLET, FILM COATED ORAL at 14:08

## 2025-04-14 ASSESSMENT — ACTIVITIES OF DAILY LIVING (ADL)
ADLS_ACUITY_SCORE: 41

## 2025-04-14 ASSESSMENT — COLUMBIA-SUICIDE SEVERITY RATING SCALE - C-SSRS
1. SINCE LAST CONTACT, HAVE YOU WISHED YOU WERE DEAD OR WISHED YOU COULD GO TO SLEEP AND NOT WAKE UP?: NO
TOTAL  NUMBER OF INTERRUPTED ATTEMPTS SINCE LAST CONTACT: NO
2. HAVE YOU ACTUALLY HAD ANY THOUGHTS OF KILLING YOURSELF?: NO
6. HAVE YOU EVER DONE ANYTHING, STARTED TO DO ANYTHING, OR PREPARED TO DO ANYTHING TO END YOUR LIFE?: NO
TOTAL  NUMBER OF ABORTED OR SELF INTERRUPTED ATTEMPTS SINCE LAST CONTACT: NO
SUICIDE, SINCE LAST CONTACT: NO
LETHALITY/MEDICAL DAMAGE CODE MOST LETHAL ACTUAL ATTEMPT: MODERATE PHYSICAL DAMAGE, MEDICAL ATTENTION NEEDED
MOST LETHAL DATE: 66567
ATTEMPT SINCE LAST CONTACT: NO

## 2025-04-14 NOTE — ED NOTES
Writer spoke with patient about transfer over to behavioral emergency room. Patient states he would prefer to stay in current room and writer told patient that this was the seclusion room and that he could not remain there- pt given option to transfer to [facility] or change to room 16A. Pt chose [facility]. Unit rules and expectations explained and patient agreed.

## 2025-04-14 NOTE — ED NOTES
Transfer Note    Data:   Reason for Transfer:  ED Observation    Brodie Carnes was transported from our ED via wheel chair at 22:00.  Patient was accompanied by hospital staff.   Action:  Report: received from Maria Alejandra DAVE. Pt. Was given a welcome packet, offered food/drink and a brief unit tour. PRN Zyprexa ODT was given per pt. Request for increase in anxiety/agitation.     Response:  Pt. Was calm, cooperative but appeared irritable. Pt. Refused vitals, denied SI/SIB/HI/AVH.  Pt. Wanted to rest/sleep in their room. Pt. Wanted to stay in his personal clothes and denied having anything in his pockets. Pt. Had shoes for belongings which were labeled and stored.

## 2025-04-14 NOTE — CONSULTS
"[facility]  Department of Psychiatry  Consultation note    Brodie Carnes MRN: [id number]   Age: 39 year old YOB: 1985     History     Chief Complaint   Patient presents with    Aggressive Behavior     Pt biba for aggressive behavior at home. Pt denies aggression SI/HI and states he speaks Indonesian which is an aggressive language so there was a misunderstanding. Pt admits to drinking a few beers at the [employer] game today.     HPI  Brodie Carnes is a 39 year old male who has at times been diagnosed with schizophrenia, bipolar disorder, narcissistic pd, depression, anxiety, alcohol use disorder who presented last night due to aggressive behaviors. He lives with his parents who called police reporting that patient threatened them. Patient denied this. He says they misunderstood what he was saying and he was not trying to threaten them. He denies any physical fights. He says \"they always do this\", meaning call the police on him. He did say he had been drinking at a [employer] game prior to the confrontation. See DEC assessment dated 4/13 for details. Patient has no psychiatric complaints today. He denies SI/HI/AVH. He was noted to sleep last night and has been calm and behaviorally in control here. He would like to restart lexapro, says he ran out a few weeks ago. He denies any acute mood concerns. He would prefer to be discharged. He says he would either go back to his parent's home or to a hotel if he thinks they cannot get along.     Per DEC assessment his parent \"don't feel safe\". They feel he is mentally ill, say that he talks to himself and talks about business opportunities. They reported that he threatened a neighbor and threw things.       He has had multiple past hospitalizations, most recent in Feb 2025 under similar circumstances to this encounter. Diagnosis remained unclear but tended towards personality disorder. He did not show manic or " "psychotic symptoms. A mood stabilizer was recommended for dysregulation but patient declined. He was discharged on lexapro and hydroxyzine prn. In Dec 2024 he was admitted with SI. No psychotic or manic symptoms were noted and discharge diagnosis was depression nos. Patient says he has been taking his medications except he ran out of lexapro recently, he would like to restart that.     Past Medical History  Past Medical History:   Diagnosis Date    Schizophrenia (H)      No past surgical history on file.  escitalopram (LEXAPRO) 5 MG tablet  hydrOXYzine HCl (ATARAX) 25 MG tablet      No Known Allergies  Family History  Family History   Problem Relation Age of Onset    Bipolar Disorder Mother      Social History           Review of Systems  A medically appropriate review of systems was performed with pertinent positives and negatives noted in the HPI, and all other systems negative.    Physical Examination   BP: (!) 133/93  Pulse: (!) 130  Temp: 98.1  F (36.7  C)  Resp: 16  Height: 175.3 cm (5' 9\")  Weight: 74.8 kg (165 lb)  SpO2: 98 %    Physical Exam  General: Appears stated age.   Neuro: Alert and fully oriented. Extremities appear to demonstrate normal strength on visual inspection.   Integumentary/Skin: no rash visualized, normal color    Psychiatric Examination   Appearance: awake, alert, adequately groomed, and casually dressed  Attitude:  cooperative  Eye Contact:  good  Mood:  good  Affect:  appropriate and in normal range  Speech:  clear, coherent  Psychomotor Behavior:  no evidence of tardive dyskinesia, dystonia, or tics  Thought Process:  logical, linear, and goal oriented  Associations:  no loose associations  Thought Content:   denies SI/HI/AVH, does not respond to internal stimuli  Insight:  fair  Judgement:  fair  Oriented to:  time, person, and place  Attention Span and Concentration:  intact  Recent and Remote Memory:  intact  Language: able to name/identify objects without impairment  Fund of " Knowledge: intact with awareness of current and past events    ED Course        Labs Ordered and Resulted from Time of ED Arrival to Time of ED Departure   URINE DRUG SCREEN PANEL - Abnormal       Result Value    Amphetamines Urine Screen Negative      Barbituates Urine Screen Negative      Benzodiazepine Urine Screen Negative      Cannabinoids Urine Screen Positive (*)     Cocaine Urine Screen Negative      Fentanyl Qual Urine Screen Negative      Opiates Urine Screen Negative      PCP Urine Screen Negative     ALCOHOL BREATH TEST POCT       Assessments & Plan (with Medical Decision Making)   Patient presenting with aggressive behaviors at  home. He has been diagnosed with a rather wide range of different psychiatric illness. Nursing notes reviewed noting no acute issues. Patient does not present as overtly psychotic or acutely manic at this point. There is clearly a fair amount of discord between him and his family and they give a different account of the situation than he does. He has been off medication but that medication is lexapro and being off that would not explain the behavior alleged by his family. It is likely that alcohol contributed to the situation last night. Patient would prefer to be discharged, plans to go to a hotel if he cannot get along with his family. I do not think he is detainable at this time.    I have reviewed the assessment completed by the [facility].     Preliminary diagnosis:    ICD-10-CM    1. Aggressive behavior  R46.89    2.      Alcohol abuse        Treatment Plan:  -Restarted the lexapro  -I do not think he can be detained at this time    --  Jessica Kerr MD   [facility] EMERGENCY DEPARTMENT

## 2025-04-14 NOTE — PLAN OF CARE
"Brodie Carnes  April 13, 2025  Plan of Care Hand-off Note     Patient Recommended Care Path: observation    Clinical Substantiation:  Brodie arrive to the ED via EMS due to agitation, aggression and homicial threats towards parents. Per triage notes, Per EMS family called 911 as patient threatening to harm parents. Parents locked themselves in room due to fear of son. Throughout this assessment, the patient is alert, oriented X4, calm and cooperative. Patient did not appear responding to internal stimili, was able to follow linear conversationm, and demonstarted reality testing ability. Patient denies having made threats to the parents. Writer attempted to clarify whether PD explained to patient the reasons he got brought to the ED, patient states \"they were just being unfair.\" Patient continues to perseverate on his belief that his parents are the ones that \"need mental health help.\" Patient states, \"Its not the first time they done this. They need help, and not me.\" Patient also states that he has been living with his parents which has been stressful due to conflict with his parents, which is incongruent with mother's report. Patient denies currently feeling paranoid or violent towards others. Pt denies SI, HI, SIB, AH/VH. Patient denies substance use concerns. Patient reports some inconsistent information about his mental health (denies MH diagnoses, denies previous IP). Per chart review, patient appear to have minimized his mental health symptoms. Chart review also shows a pattern of poor adherence to MH recommendations. It appears that mental health symptoms and substance use may contribute to some of the current presentations. However, patient does not present as holdable- denies current SI/HI/NSSIB, no signs of altered mental state, appears able to make informed medical related decision on his own, demonstrated reality checking ability. Patient's parents are aware of the homicidal threats and have reported " it to PD. Writer discussed with patient regarding treatment plan, patient agreed to stay overnight under obsevatio and meet with psychiatry to restart his medications.     After therapeutic assessment, intervention and aftercare planning by ED care team and LM and in consultation with attending provider, the patient's circumstances and mental state were appropriate observation.    Treatment Objectives Addressed:  identifying and practicing coping strategies, rapport building, assessing safety, identifying treatment goals, identifying an appropriate aftercare plan, building distress tolerance, safety planning    Therapeutic Interventions:  Engaged in safety planning    Has a specific means been identified for suicidal.homicide actions: No    Patient coping skills attempted to reduce the crisis:  Come to the ED       Collateral contact information:  Nia (mother)    Legal Status: Voluntary/Patient has signed consent for treatment                                                     Reviewed court records: yes     Psychiatry Consult: Patient has Psychiatry Consult Order    Lor Juarez

## 2025-04-14 NOTE — ED NOTES
Pt denies SI/HI, AVH, contracted for safety. Calm. Isolates to self/room. Naps during the day. Will continue to monitor Pt for safety and any changes in mood and behavior.

## 2025-04-14 NOTE — ED NOTES
Pt denies SI/HI, AV, contracted for safety. Pt is in a w/c with all of his discharge paperwork, personal belongings, discharged home  from the [facility] unit at 16:50 via the taxi.

## 2025-04-14 NOTE — ED NOTES
Writer attempted to call the unit and speak w/ pts nurse and discuss PRISCILLA for OP psychiatry provider/appt scheduled faxed to the unit for pt to sign before discharging. Charge nurse requested writer call back in 30 minutes to talk with pts nurse.     Please have pt sign and date PRISCILLA for psych provider so records can be released for future appt.         BRANDEN Lloyd  [facility]  [phone]

## 2025-04-14 NOTE — PROGRESS NOTES
"Triage and Transition Services Extended Care Reassessment     Patient: Brodie goes by \"Brodie,\" uses he/him pronouns  Date of Service: April 14, 2025  Site of Service: [facility] Emergency Department                             [id number]  Patient was seen yes  Mode of Assessment: In person     Reason for Reassessment:  (Reassess for Disposition)    History of Patient's Original Emergency Room Encounter:   Brodie Carnes is a 39 year old male who arrive to the ED via EMS due to agitation, aggression and homicial threats towards parents. Per triage notes, Per EMS family called 911 when pt arrived home from a basketball game and began threatening to harm parents. Parents locked themselves in room due to fear of son. Pt denies these events, states that he is here because \"we speak two languages. We speak Salvadorean which is an aggressive language and english and things get misinterpreted.\" Pt denies SI/HI. Pt states that he is taking the meds prescribed to him \"the last time this happened\" but could not name them. Pt admits to drinking at basketball game tonight but denies other substance use. Throughout this assessment, the patient is alert, oriented X4, calm and cooperative. Patient did not appear responding to internal stimili, was able to follow linear conversationm, and demonstarted reality testing ability. When asked about the events that brought him to the ED, patient recounted that he came home from the game, his dad started shouting at him, they exchanged words, and patient's dad started to bang on the door. Parents called 911 and patient was brought here. Patient reports \"my father is controlling and he does that all the times. I stay to my self.\" Patient denies having made threats to the parents. Patient reports \"The most I said to them was. Lamar will come back and bite you all in the ass.\" Writer attempted to clarify whether PD explained to patient the reasons he got brought to the ED, patient states " "\"they were just being unfair.\" Patient continues to perseverate on his belief that his parents are the ones that \"need mental health help.\" Patient states, \"Its not the first time they done this. They need help, and not me.\" Patient also states that he has been living with his parents which has been stressful due to conflict with his parents, which is incongruent with mother's report. Patient denies currently feeling paranoid or violent towards others. Pt denies SI, HI, SIB, AH/VH. Patient denies substance use concerns. Patient reports some inconsistent information about his mental health (denies MH diagnoses, denies previous IP). Per chart review, patient appear to have minimized his mental health symptoms. When asked about current MH support, patient reports he is prescribed psychotropic medications but hasn't taken for about 10 days because he ran out.     Per chart review, patient was seen in 2/13 due to homicidal threats toward his parents. Patient was on a 72 hours hold and admitted to IP for 4 days. Patient eventually discharged back to his parents. Also per chart review, Brodie carries previous diagnoses of Bipolar 1 disorder, Schizophrenia, NURY, and Narcissistic personality disorder, per chart. Per collateral information from mother, pt has been calling her daily telling her insults, but she has never heard him make a homicidal statement until today. Pt has also recently shaved his head, which is not normal for him.   Pt has hx of [facility] admissions, most recently in December of 2024 at [facility]. Pt was also hospitalized in 2022 following a suicide attempt of cutting his neck. At this time, pt was also presenting with paranoia and disorganized thinking. Parents note that pt has had difficulty functioning since this attempt in 2022. Pt is prescribed medication and was referred to outpatient psychiatry in December of 2024 but has not followed up with referral or taken medications as prescribed. Pt has hx of " civil commitment that ended in 2023. Pt reports being unemployed and staying in various hotels. Pt reports lack of social support.    Current Patient Presentation: LMHP met with pt in his ED room. Writer remembered pt from previous similar incident in February. Pt is calm and cooperative to meet with writer. He reports a current willingness to take medications, but reports that he stopped because he ran out of his medicaitions from the past visit from the hospital. Writer suggested that the psychiatrist can send his medications to his pharmacy, and writer can set him up with a telepsychiatry appointment with an ongoing provider in the community and encouraged him that this is the best way to have his medications managed for his mental health. He was agreeable to this plan. He agrees that the tension grows with his family and that he needs to get his own apartment and his parents are going to help him get one. Pt is on disability for his mental health, but denies having a current  or CADI waiver. Writer offered to submit referral for case management services as pt lives in [facility], pt was agreeable to this. Pt denies any SI, NSSIB, HI, AH/VH at this time. He agrees to separate himself from his parents if tensions arise again.    Presentation Summary: While pt does have a hx of mental health issues and lacks insight into his mental health condition at time, he is not holdable and does not wish to go to inpatient at this time. Pt was seen by psychiatry and was willing to be restarted on medications. Some of pts presentation is likely behavioral and cannot be completely managed by medications. Due to this, pt was strongly encouraged to follow-up with providers in the community, to continue taking his medications, and to consider programmatic care like a DBT IOP, and to meet with a .    Changes Observed Since Initial Assessment: decrease in presenting symptoms, patient/family request,  provider request    Therapeutic Interventions Provided: Engaged in cognitive restructuring/ reframing, looked at common cognitive distortions and challenged negative thoughts., Engaged in guided discovery, explored patient's perspectives and helped expand them through socratic dialogue., Explored motivation for behavioral change., Engaged in safety planning    Current Symptoms: anxious negativistic, impaired decision making anxious impulsive, displaces blame      Mental Status Exam   Affect: Appropriate  Appearance: Appropriate  Attention Span/Concentration: Attentive  Eye Contact: Variable    Fund of Knowledge: Appropriate   Language /Speech Content: Fluent  Language /Speech Volume: Normal  Language /Speech Rate/Productions: Normal  Recent Memory: Intact  Remote Memory: Variable  Mood: Anxious  Orientation to Person: Yes   Orientation to Place: Yes  Orientation to Time of Day: Yes  Orientation to Date: Yes     Situation (Do they understand why they are here?): Yes  Psychomotor Behavior: Normal  Thought Content: Clear  Thought Form: Intact, Goal Directed    Treatment Objective(s) Addressed: rapport building, orienting the patient to therapy, processing feelings, building distress tolerance, assessing safety, exploring obstacles to safety in the community, identifying and practicing coping strategies, safety planning, identifying an appropriate aftercare plan, anger management    Patient Response to Interventions: acceptance expressed, verbalizes understanding, needs reinforcement    Progress Towards Goals:  Patient Reports Symptoms Are: improving  Patient Progress Toward Goals: is making progress  Next Step to Work Toward Discharge: symptom stabilization, patient ability to engage in safety planning  Symptom Stabilization Comment: Pt was willing to be restarted on medication and to follow-up on his medication.  Ability to Engage Comment: Pt was willing for writer to place a referral for case management services. He  agrees to remove himself from the situation if things escalate at home.    Case Management:   Details on Collaborating with Patient's Support System: Mother, Nia Carnes, [phone]  Summary of Interaction: TIGRE spoke with pt's mother about the plan for pt to discharge today with a refill of his prescriptions, future appt with a medication management provider, and a . She was frustrated and said that the ED needs to keep pt for at least a 72HH. Writer let her know that pt has been calm and cooperative while in the ED and right now is not holdable and as his own legal guardian he is able to discharge now if that's what he wants to do. She then said, well then he will come back to the house then, because he has nowhere else to go. Writer stated, those are decisions that are up to you if he is allowed in your house or not, and encouraged her to follow up with a therapist and encouraged her to continue calling police if she does not feel safe. TIGRE stated that we encouraged pt to take medication and set him up with an ongoing medicaiton management provider and I'm making a referral for a .    C-SSRS Since Last Contact:   1. Wish to be Dead (Since Last Contact): No  2. Non-Specific Active Suicidal Thoughts (Since Last Contact): No     Actual Attempt (Since Last Contact): No  Has subject engaged in non-suicidal self-injurious behavior? (Since Last Contact): No  Interrupted Attempts (Since Last Contact): No  Aborted or Self-Interrupted Attempt (Since Last Contact): No  Preparatory Acts or Behavior (Since Last Contact): No  Suicide (Since Last Contact): No  Most Lethal Attempt Date: 04/03/23 (Pt has prior attempt in 2023 after attempting to slit his throat.)  Actual Lethality/Medical Damage Code (Most Lethal Attempt): Moderate physical damage, medical attention needed  Calculated C-SSRS Risk Score (Since Last Contact): No Risk Indicated    Plan: Final Disposition / Recommended Care Path:  "discharge  Plan for Care reviewed with assigned Medical Provider: yes (Dr. Bonilla and Dr. Kerr, Psychiatry)  Comments: Positive for cannabis  Patient and/or validated legal guardian concurs: yes    Clinical Substantiation: Brodie arrived to the ED via EMS due to agitation, aggression and homicial threats towards parents. Per EMS family called 911 as patient threatening to harm parents. Parents locked themselves in room due to fear of son. Throughout this assessment, the patient is alert, oriented X4, calm and cooperative. Patient did not appear responding to internal stimili, was able to follow linear conversationm, and demonstarted reality testing ability. Patient denies having made threats to the parents. Writer attempted to clarify whether PD explained to patient the reasons he got brought to the ED, patient states \"they were just being unfair.\" Patient continues to perseverate on his belief that his parents are the ones that \"need mental health help.\" Patient states, \"Its not the first time they done this. They need help, and not me.\" Patient also states that he has been living with his parents which has been stressful due to conflict with his parents, which is incongruent with mother's report. Patient denies currently feeling paranoid or violent towards others. Pt denies SI, HI, SIB, AH/VH. Patient denies substance use concerns. Patient reports some inconsistent information about his mental health (denies MH diagnoses, denies previous IP). Yesterday, Patient agreed to stay overnight under obsevation and meet with psychiatry to restart his medications. Today, pt met with psychiatry and [facility] for brief therapeutic reassessment. Pt reports willingness to restart his medications, be scheduled with a telepsychiatrist, and to have a referral placed for case management services to help him with housing . Pt denies SI, NSSIB, HI, AH/VH and was willing to safety plan with writer. He was encouraged to step away from " the situation if there is escalating conflict with parents. He agrees to this plan. After brief period of observation, therapeutic reassessment, chart review, and in consultation with attending and psychiatry, this clinician recommends that pt be discharged to outpatient services. PT was scheduled with tele-psychiatry appointment and his medications were sent to his pharmacy.       Legal Status: Legal Status: Voluntary/Patient has signed consent for treatment    Session Status: Time session started: 1500  Time session ended: 1516  Session Duration (minutes): 16 minutes  Session Number: 2  Anticipated number of sessions or this episode of care: 2    Session Start Time: 1500  Session Stop Time: 1516  CPT codes: 54237 - Psychotherapy (with patient) - 30 (16-37*) min  Time Spent: 16 minutes      CPT code(s) utilized: 67980 - Psychotherapy (with patient) - 30 (16-37*) min    Diagnosis:   Patient Active Problem List   Diagnosis Code    Emily (H) F30.9    Narcissistic personality disorder (H) F60.81    Bipolar 1 disorder, manic, moderate (H) F31.12    Generalized anxiety disorder F41.1    Tobacco abuse Z72.0    Psychosis, unspecified psychosis type (H) F29    Agitation R45.1    Homicidal ideation R45.850    Aggressive behavior R46.89       Primary Problem This Admission: Active Hospital Problems    *Aggressive behavior      Bipolar 1 disorder, manic, moderate (H)      Narcissistic personality disorder (H)        TONY Buckley   Licensed Mental Health Professional (LMHP), [facility]  [phone]

## 2025-04-14 NOTE — DISCHARGE INSTRUCTIONS
Discharge Recommendations:   Please  your medications from your pharmacy, and take them as prescribed.   Please follow-up with the appointment below for establishing with a new psychiatric medication manager.   I'm placing a referral for you for case management services through [facility], please work with them to see if they can support you around housing.   Please use your safety plan to help you better manage your emotions.   I recommend that you start working with a therapist, or even better yet, complete a DBT  or Dialectecal Behavioral Therapy Program to learn more skills to manage your mental health. Please feel free to call this line if you are open to getting support to get set up with either of these. Behavioral Access - [phone],  call [facility] Coordinators at [phone] if they have any questions or need any additional resources.        Appointment Information:  Date: Monday, 4/28/2025  Time: 10:00 am - 11:00 am  Provider: Adwoa Bernal  MSN  APRN,CNP,PMARACELIP,RN  Location: [address], [place] [address]  Phone: [phone]  Type: Telepsychiatry (VIRTUAL)    Patient instructions  1. All intake paperwork will be completed electronically. 2. Appointment will be confirmed after all intake paperwork is completed. 3. Bring your hospital discharge paperwork on the day of the appointment or [email or web address] Location is close to the main road and bus route. Free parking Bring Insurance and discharge paperwork The office is on the 3rd floor Arrive 10-15 minutes for insurance verification Telehealth- - No driving. You cannot be on public transport e.g city bus            TODAY'S VISIT:  You were seen today for aggressive behavior  -   - If you had any labs or imaging/radiology tests performed today, you should also discuss these tests with your usual provider.     FOLLOW-UP:  Please make an appointment to follow up with:  - Your Primary  Care Provider. If you do not have a PCP, please call the Primary Care Center (phone: [phone] for an appointment    - Have your provider review the results from today's visit with you again to make sure no further follow-up or additional testing is needed based on those results.     RETURN TO THE EMERGENCY DEPARTMENT  Return to the Emergency Department at any time for any new or worsening symptoms or any concerns.

## 2025-04-14 NOTE — DISCHARGE SUMMARY
"ED Observation Discharge Summary  [facility]  Discharge Date: 4/14/2025    Brodie Carnes MRN: [id number]   Age: 39 year old YOB: 1985     Brief HPI & Initial ED Course     Chief Complaint   Patient presents with    Aggressive Behavior     Pt biba for aggressive behavior at home. Pt denies aggression SI/HI and states he speaks Dutch which is an aggressive language so there was a misunderstanding. Pt admits to drinking a few beers at the [employer] game today.     HPI  Brodie Carnes is a 39 year old male with PMH notable for alcohol use disorder, schizophrenia, bipolar disorder, Siomara personality disorder, depression, anxiety who presented to the ED with a psychiatric concern.  That he was having aggressive behaviors in the setting of medication nonadherence alcohol use disorder and drinking at a temporal's game.      The patient was evaluated in the emergency department by a physician and DEC behavioral health . The DEC  recommended observation. See separate DEC note from this encounter for details on the assessment. The patient's psychiatric state was such that he would benefit from ongoing monitoring. Observation care was initiated with the plan including serial assessments of psychiatric condition, potential administration of medications if indicated, and further disposition pending the patient's psychiatric course during the monitoring period.     See ED Observation H&P for further details on the patient's presenting history and initial evaluation.     Physical Exam   BP: (!) 133/93  Pulse: (!) 130  Temp: 98.1  F (36.7  C)  Resp: 16  Height: 175.3 cm (5' 9\")  Weight: 74.8 kg (165 lb)  SpO2: 98 %    Physical Exam  General: . Appears stated age.   HENT: MMM, no oropharyngeal lesions  Eyes: PERRL, normal sclerae   Cardio: Regular rate, extremities well perfused  Resp: Normal work of breathing, normal respiratory rate  Neuro: alert and fully " oriented. CN II-XII grossly intact. Grossly normal strength and sensation in all extremities.   MSK: no deformities.   Integumentary/Skin: no rash visualized, normal color  Psych: Friendly affect, appropriate behavior.  Denies SI.  Denies HI.  Denies hallucinations. Thought process linear . Insight -minimizing.     Results      Procedures            Labs Ordered and Resulted from Time of ED Arrival to Time of ED Departure   URINE DRUG SCREEN PANEL - Abnormal       Result Value    Amphetamines Urine Screen Negative      Barbituates Urine Screen Negative      Benzodiazepine Urine Screen Negative      Cannabinoids Urine Screen Positive (*)     Cocaine Urine Screen Negative      Fentanyl Qual Urine Screen Negative      Opiates Urine Screen Negative      PCP Urine Screen Negative     ALCOHOL BREATH TEST POCT            Observation Course   The patient was found to have a psychiatric condition that would benefit from an observation stay in the emergency department for further psychiatric stabilization and/or coordination of a safe disposition. The plan upon observation admission included serial assessments of psychiatric condition, potential administration of medications if indicated, further disposition pending the patient's psychiatric course during the monitoring period.     Serial assessments of the patient's psychiatric condition were performed. Nursing notes were reviewed. During the observation period, the patient did not require medications for agitation, and did not require restraints/seclusion for patient and/or provider safety.     He was seen by DEC/extended care and psychiatry, so medications have been restarted and he was advised to continue to work on reducing his alcohol intake       After the period in observation care, the patient's circumstances and mental state were safe for outpatient management. After counseling on the diagnosis, work-up, and treatment plan, the patient was discharged. Close  follow-up with a psychiatrist and/or therapist was recommended and community psychiatric resources were provided. Patient is to return to the ED if any urgent or potentially life-threatening concerns.      Discharge Diagnoses:   Final diagnoses:   Aggressive behavior       --  Neil Colorado Jr., MD   [facility] EMERGENCY DEPARTMENT  4/14/2025
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
## 2025-04-14 NOTE — PHARMACY-ADMISSION MEDICATION HISTORY
Pharmacist Admission Medication History    Admission medication history is complete. The information provided in this note is only as accurate as the sources available at the time of the update.    Information Source(s): Patient via in-person    Pertinent Information: Reported that he ran out of medications two weeks ago.     Changes made to PTA medication list:  Added: hydroxyzine   Deleted: None  Changed: None    Allergies reviewed with patient and updates made in EHR: yes    Medication History Completed By: Aleksandr Ferguson [facility] 4/14/2025 8:25 AM    PTA Med List   Medication Sig Last Dose/Taking    escitalopram (LEXAPRO) 5 MG tablet Take 3 tablets (15 mg) by mouth daily. Past Month    hydrOXYzine HCl (ATARAX) 25 MG tablet Take 50 mg by mouth every 4 hours as needed for anxiety. Past Month

## 2025-04-15 ENCOUNTER — TELEPHONE (OUTPATIENT)
Dept: BEHAVIORAL HEALTH | Facility: CLINIC | Age: 40
End: 2025-04-15
Payer: MEDICAID

## 2025-04-15 NOTE — TELEPHONE ENCOUNTER
Triage and Transition Services- Patient Follow Up Call  Service Line Making Phone Call: Extended Care    Who did Writer Talk to: Patient    Details of Call: Left voicemail with return callback number and date and time of virtual appointment.     Analilia Abrams 4/15/2025 1:47 PM

## 2025-04-16 ENCOUNTER — PATIENT OUTREACH (OUTPATIENT)
Dept: CARE COORDINATION | Facility: CLINIC | Age: 40
End: 2025-04-16
Payer: MEDICAID

## 2025-04-16 NOTE — PROGRESS NOTES
Connected Care Resource Center Contact  [facility]/Voicemail     Clinical Data: Post-Discharge Outreach     Outreach attempted x 2.  Left message on patient's voicemail, providing [facility]'s central phone number of [phone] ([phone]) for questions/concerns and/or to schedule an appt with an [facility] provider, if they do not have a PCP.      Plan:  [facility] will do no further outreaches at this time.       BRANDEN Bull  Connected Care Resource Center, [facility]    *Connected Care Resource Team does NOT follow patient ongoing. Referrals are identified based on internal discharge reports and the outreach is to ensure patient has an understanding of their discharge instructions.

## 2025-06-21 ENCOUNTER — HOSPITAL ENCOUNTER (OUTPATIENT)
Facility: CLINIC | Age: 40
Setting detail: OBSERVATION
Discharge: HOME | End: 2025-06-23
Attending: EMERGENCY MEDICINE | Admitting: EMERGENCY MEDICINE
Payer: MEDICAID

## 2025-06-21 DIAGNOSIS — F41.9 ANXIETY: ICD-10-CM

## 2025-06-21 PROBLEM — F41.1 GENERALIZED ANXIETY DISORDER: Status: ACTIVE | Noted: 2017-09-22

## 2025-06-21 PROCEDURE — 250N000013 HC RX MED GY IP 250 OP 250 PS 637: Performed by: EMERGENCY MEDICINE

## 2025-06-21 PROCEDURE — 99223 1ST HOSP IP/OBS HIGH 75: CPT | Performed by: EMERGENCY MEDICINE

## 2025-06-21 PROCEDURE — 99285 EMERGENCY DEPT VISIT HI MDM: CPT

## 2025-06-21 PROCEDURE — G0378 HOSPITAL OBSERVATION PER HR: HCPCS

## 2025-06-21 RX ORDER — OLANZAPINE 10 MG/1
10 TABLET, ORALLY DISINTEGRATING ORAL 3 TIMES DAILY PRN
Status: DISCONTINUED | OUTPATIENT
Start: 2025-06-21 | End: 2025-06-23 | Stop reason: HOSPADM

## 2025-06-21 RX ORDER — HYDROXYZINE HYDROCHLORIDE 25 MG/1
25-50 TABLET, FILM COATED ORAL EVERY 6 HOURS PRN
Status: DISCONTINUED | OUTPATIENT
Start: 2025-06-21 | End: 2025-06-23 | Stop reason: HOSPADM

## 2025-06-21 RX ORDER — ACETAMINOPHEN 325 MG/1
650 TABLET ORAL EVERY 4 HOURS PRN
Status: DISCONTINUED | OUTPATIENT
Start: 2025-06-21 | End: 2025-06-23 | Stop reason: HOSPADM

## 2025-06-21 RX ORDER — ESCITALOPRAM OXALATE 20 MG/1
20 TABLET ORAL DAILY
Status: DISCONTINUED | OUTPATIENT
Start: 2025-06-22 | End: 2025-06-23 | Stop reason: HOSPADM

## 2025-06-21 RX ADMIN — HYDROXYZINE HYDROCHLORIDE 50 MG: 25 TABLET, FILM COATED ORAL at 20:17

## 2025-06-21 ASSESSMENT — ACTIVITIES OF DAILY LIVING (ADL)
ADLS_ACUITY_SCORE: 41

## 2025-06-21 ASSESSMENT — COLUMBIA-SUICIDE SEVERITY RATING SCALE - C-SSRS
6. HAVE YOU EVER DONE ANYTHING, STARTED TO DO ANYTHING, OR PREPARED TO DO ANYTHING TO END YOUR LIFE?: NO
1. IN THE PAST MONTH, HAVE YOU WISHED YOU WERE DEAD OR WISHED YOU COULD GO TO SLEEP AND NOT WAKE UP?: NO
2. HAVE YOU ACTUALLY HAD ANY THOUGHTS OF KILLING YOURSELF IN THE PAST MONTH?: NO

## 2025-06-21 NOTE — ED TRIAGE NOTES
Pt brought from [facility] via EMS (VSS oriented x 4 ) pt states he got out that way via bus pass. Here for extreme anxiety & stress due being kicked out of his parents house 3-4 days ago and being taken to custodial. Pt states he was hospitalized June 1st for dehydration. He states he and his family were arguing and that is all that happened when PD was called. Pt has tried to coordinate with PD to go to the house and get his belongings but has been unsuccessful and every time he does try his parents call PD on him. Pt hasn't had his prescribed meds for 3 days wasnt not able to get them last night (wasn't allowed) pt states he was supposed to  the refill.  Pt states he was taken to custodial for verbal threatening his parents.       Pt states he is his own guardian but his parents have power over his finances which they are holding from him.       Pt denies SI, AH/VH, drugs or ETOH he admits he does drinks beer and seltzers sometimes.

## 2025-06-22 VITALS
OXYGEN SATURATION: 100 % | SYSTOLIC BLOOD PRESSURE: 150 MMHG | DIASTOLIC BLOOD PRESSURE: 98 MMHG | TEMPERATURE: 98.2 F | HEART RATE: 90 BPM | RESPIRATION RATE: 18 BRPM

## 2025-06-22 PROCEDURE — 99254 IP/OBS CNSLTJ NEW/EST MOD 60: CPT | Performed by: CLINICAL NURSE SPECIALIST

## 2025-06-22 PROCEDURE — 250N000013 HC RX MED GY IP 250 OP 250 PS 637: Performed by: EMERGENCY MEDICINE

## 2025-06-22 PROCEDURE — G0378 HOSPITAL OBSERVATION PER HR: HCPCS

## 2025-06-22 RX ORDER — ESCITALOPRAM OXALATE 20 MG/1
20 TABLET ORAL DAILY
COMMUNITY

## 2025-06-22 RX ADMIN — HYDROXYZINE HYDROCHLORIDE 50 MG: 25 TABLET, FILM COATED ORAL at 13:39

## 2025-06-22 RX ADMIN — OLANZAPINE 10 MG: 10 TABLET, ORALLY DISINTEGRATING ORAL at 20:18

## 2025-06-22 RX ADMIN — ACETAMINOPHEN 650 MG: 325 TABLET ORAL at 13:47

## 2025-06-22 RX ADMIN — ESCITALOPRAM 20 MG: 20 TABLET, FILM COATED ORAL at 09:18

## 2025-06-22 ASSESSMENT — ACTIVITIES OF DAILY LIVING (ADL)
ADLS_ACUITY_SCORE: 41

## 2025-06-22 NOTE — ED NOTES
Patient fixated on home medications. Call to MD. To order Lexapro which is the last home medication for order.  Received telephone order for 15mg. And then received information from pharmacy 20 mg. Was ordered prevsiously, discontinued 15mg. order

## 2025-06-22 NOTE — PLAN OF CARE
Brodie Carnes  June 21, 2025  Plan of Care Hand-off Note     Patient Recommended Care Path: observation    Clinical Substantiation:  Patient presented to the ED due to worsening anxiety in the context of worsening psychosocial stressors. Pt stated that he is not allowed to return to his parents' home due to aggression and verbally threatening them. Pt stated that his car, cell phone, medications are all at his parents' home, making it difficult for him to function. He has attempted to retrieve his belongings for the last two days, but was sent to custodial after his parents called the police. Pt was released today and went to [facility] and had a staff member call for an ambulance because he was feeling extremely anxious and overwhelmed. He has court on Monday for this, which he is nervous for. He has been off of his medications for three days and is wanting to restart them in the ED. Pt presented as visibly anxious and on edge. He denied SI and HI. Pt would benefit from a night in observation and possible discharge to a crisis facility. There are no openings for a crisis bed at this time.    Goals for crisis stabilization:  restart medications    Next steps for Care Team:  psych consult, reassessment, crisis referral    Treatment Objectives Addressed:  identifying and practicing coping strategies, rapport building, assessing safety, identifying treatment goals, identifying an appropriate aftercare plan, building distress tolerance, safety planning    Therapeutic Interventions:  Reviewed healthy living that supports positive mental health, including looking at sleep hygiene, regular movement, nutrition, and regular socialization., Provided positive reinforcement for progress towards goals, gains in knowledge, and application of skills previously taught., Explored motivation for behavioral change.    Has a specific means been identified for suicidal.homicide actions: No        Patient coping skills attempted to reduce  the crisis:  Pt asked [facility] staff to call for EMS            Collateral contact information:  Nia (mother) [phone]    Legal Status: Voluntary/Patient has signed consent for treatment                                                                                                                                 Reviewed court records: yes     Psychiatry Consult:     TONY Ruiz

## 2025-06-22 NOTE — CONSULTS
"[facility] ED  Department of Psychiatry  Consultation note    Brodie Carnes MRN: [id number]   Age: 39 year old YOB: 1985     History     Chief Complaint   Patient presents with    Anxiety     HPI  Brodie Carnes is a 39 year old male with history notable for diagnostic ambiguity who was brought in to the ED by EMS yesterday from the [facility] for evaluation of extreme anxiety and stress in the context of being asked to leave his parents' house a a few days prior. He was reportedly taken to [facility] for aggression towards his parents. He has been diagnosed with schizophrenia, bipolar disorder, narcissistic personality disorder, depression, anxiety, and alcohol use disorder. Please see DEC assessment for details.    Records indicate a history of multiple psychiatric admissions, most recent of which was in February 2025 for homicidal ideation towards his mother over financial matters. He was hospitalized in December 2024 for suicidal ideation. In 2022, he attempted suicide by cutting his neck and was described as paranoid with disorganized thinking. He was on civil commitment which ended in 2023. H&P from Dr. Mondragon indicate \"a longstanding pattern of emotional dysregulation, poor frustration tolerance, affective instability, impulsivity, grandiosity, and recurrent interpersonal conflicts.\"     On exam today, Brodie reports that he is okay but is feeling very anxious about his court appearance tomorrow. He says that his parents had a restraining order against him and he was not supposed to be within a 1000 ft of his parents' house, but he was trying to get his belongings. He has been trying to get them from his parents for the past few days but has been unsuccessful. His relationship with his parents has not been very good for some time. His parents called the police and this is what he needs to be at the [facility] in [place] " at 8 am tomorrow for.     He denies problems with sleep and appetite prior to coming to the hospital. He has not had much of an appetite since he arrived here, but does not forsee this continuing when he discharges and after he finds out how the court will adjudicate his case. He does not think that he will go to [facility] though. He has not been able to take his medications (escitalopram and hydroxyzine) for about 3-4 days now because these are at his parents' house, and they refuse to let him in. He finds the medications helpful, and will be able to get a refill soon as he is due.     He tells me that he has not been happy for sometime now due to the conflict with his parents. In addition to all of his belongings, they manage his money. This appears to be an area of recurrent conflict. He denies having suicidal and homicidal thoughts. He denies having hallucinations or delusions, and there was nothing to suggest that he is attending to internal stimuli. He uses alcohol but no other substances, and feels in control of his drinking. He says that he does not consider his use problematic. No UDS available.    He has been calm and cooperative since he arrived, and has not required locked seclusion or restraints. He requested hydroxyzine yesterday at 2017 and today at 1339. He has been medication adherent.      Past Medical History  Past Medical History:   Diagnosis Date    Schizophrenia (H)      History reviewed. No pertinent surgical history.  escitalopram (LEXAPRO) 5 MG tablet  hydrOXYzine HCl (ATARAX) 25 MG tablet      No Known Allergies  Family History  Family History   Problem Relation Age of Onset    Bipolar Disorder Mother      Social History       Past medical history, past surgical history, medications, allergies, family history, and social history were reviewed with the patient. No additional pertinent items.       Review of Systems  A medically appropriate review of systems was performed with pertinent positives  "and negatives noted in the HPI, and all other systems negative.    Physical Examination   BP: (!) 144/97  Pulse: 81  Temp: 97.7  F (36.5  C)  Resp: 18  SpO2: 98 %    Physical Exam  General: Appears stated age.   Neuro: Alert and fully oriented. Extremities appear to demonstrate normal strength on visual inspection.   Integumentary/Skin: no rash visualized, normal color    Psychiatric Examination   Appearance: awake, alert, adequately groomed, and casually dressed, wearing a sports jersey  Attitude:  somewhat cooperative  Eye Contact:  good  Mood:  \"not happy\"  Affect:  mood congruent and intensity is normal  Speech:  clear, coherent  Psychomotor Behavior:  no evidence of tardive dyskinesia, dystonia, or tics and intact station, gait and muscle tone  Thought Process:  goal oriented  Associations:  no loose associations  Thought Content:  no evidence of suicidal ideation or homicidal ideation and no evidence of psychotic thought  Insight:  limited  Judgement:  limited  Oriented to:  time, person, and place  Attention Span and Concentration:  intact  Recent and Remote Memory:  intact  Language: able to name/identify objects without impairment  Fund of Knowledge: intact with awareness of current and past events    ED Course        Labs Ordered and Resulted from Time of ED Arrival to Time of ED Departure - No data to display    Assessments & Plan (with Medical Decision Making)   Patient presenting with emotion dysregulation in the context of conflict with parents, who have refused him access to their house, which has all of his belongings, including his medications. They have filed a restraining order against him, which he violated by going to the house to get his things back. He has been dealing with a lot of stress, and his anxiety has markedly increased. His main concern at this time is to ensure that he can attend his court hearing tomorrow morning at 8 am at the [facility] in [place]. He needs " to know what the outcome will be before he can make plans regarding housing, etc. He denies the need for a crisis residence at this time. He is due for refills on his medications so he will be able to obtain those soon.     Nursing notes reviewed noting no acute issues.     I have reviewed the assessment completed by the [facility].     Preliminary diagnosis:    ICD-10-CM    1. Anxiety  F41.9    2.      Problems related to other legal circumstances  3.      Adjustment disorder with mixed disturbance of emotions and conduct  4.      Alcohol use        Treatment Plan:  - overnight observation and discharge by 7 am at the latest tomorrow morning so that he can make it to court at 8 am. Does not need to be reassessed prior to leaving.     - no medication changes. Continue PTA meds:   - escitalopram (Lexapro) 20 mg daily for depression and anxiety   - hydroxyzine (Atarax) 25-50 mg Q6 hours PRN for acute anxiety        --  STACI Brito CNP   [facility] EMERGENCY DEPARTMENT

## 2025-06-22 NOTE — PROGRESS NOTES
Extended Care clinician attempted to see this patient at 11:10am and was notified by nursing staff that patient is still sleeping and unable to participate. Writer will attempt to see him again later in the day.

## 2025-06-22 NOTE — ED NOTES
Patient arrived on the unit and reviewed search, belongings secured.  Patient calm cooperative with requests.  Denies SI, HI, AVH at this time.  Patient appears drowsy, patient shown restroom and water fountain, reviewed security camera use.  Patient asked to complete menu and then patient in bed resting.

## 2025-06-22 NOTE — ED NOTES
Pt endorsing L. Knee pain 7/10. Received PRN Tylenol 650 mg. Declined ice pack. Also R'cvd PRN Hydroxyzine 50 mg per request for anxiety. He denies SI/SIB/HI. Denies AVH. His B/P is elevated at 153/103, likely r/t current pain level. Will recheck b/p.    Poor appetite - pt hasn't touched his lunch tray.

## 2025-06-22 NOTE — ED PROVIDER NOTES
[facility] EMERGENCY DEPARTMENT ([facility])    6/21/25       ED PROVIDER NOTE  History     Chief Complaint   Patient presents with    Anxiety     HPI  Brodie Carnes is a 39 year old male with a past medical history of alcohol withdrawal syndrome, anxiety, depression, suicidal ideation, and schizophrenia, who presents to the ED for evaluation of anxiety. The patient states he has not been taking his medications for a few days. He states that recently he has been getting kicked out of living with his family and this has been a source of stress. He has not been able to get his belongings there because he states that he has to have police with him to go to their house. He states that today he was at [facility] and he felt like his body was shutting down and he felt weak. EMS was called to bring him here.     Patient is homeless. Was recently in senior living because he tried to get his items from home. Meds are at parents house. Denies substance use. Feels anxious. The patient called EMS himself.     Past Medical History  Past Medical History:   Diagnosis Date    Schizophrenia (H)      History reviewed. No pertinent surgical history.  escitalopram (LEXAPRO) 5 MG tablet  hydrOXYzine HCl (ATARAX) 25 MG tablet      No Known Allergies  Family History  Family History   Problem Relation Age of Onset    Bipolar Disorder Mother      Social History       A medically appropriate review of systems was performed with pertinent positives and negatives noted in the HPI, and all other systems negative.    Physical Exam   BP: (!) 144/97  Pulse: 81  Temp: 97.7  F (36.5  C)  Resp: 18  SpO2: 98 %  Physical Exam  Constitutional:       General: He is not in acute distress.     Appearance: He is not toxic-appearing.   HENT:      Head: Normocephalic and atraumatic.      Nose: Nose normal.      Mouth/Throat:      Mouth: Mucous membranes are moist.      Pharynx: Oropharynx is clear.   Eyes:      Conjunctiva/sclera: Conjunctivae normal.    Cardiovascular:      Rate and Rhythm: Normal rate and regular rhythm.   Pulmonary:      Effort: Pulmonary effort is normal. No respiratory distress.      Breath sounds: No wheezing.   Musculoskeletal:         General: Normal range of motion.   Skin:     General: Skin is warm and dry.   Neurological:      General: No focal deficit present.      Mental Status: He is alert and oriented to person, place, and time.   Psychiatric:         Mood and Affect: Mood is anxious.         Behavior: Behavior is not agitated or aggressive.         Thought Content: Thought content does not include suicidal ideation.           ED Course, Procedures, & Data      Procedures       -----  Observation Addendum  With this Addendum, this ED Provider Note may also serve as an Observation H&P    Observation Initiation Date: Jun 21, 2025    Patient presenting with anxiety.    A DEC assessment was completed, and the case was discussed with the . The  recommended observation and re-starting medications. See separate DEC note from today's date for details on the assessment.    During the initial care period, the patient did not require medications for agitation, and did not require restraints/seclusion for patient and/or provider safety.     The patient's outpatient medications were reconciled and ordered.     The patient was found to have a psychiatric condition that would benefit from an observation stay in the emergency department for further psychiatric stabilization and/or coordination of a safe disposition. The observation plan includes serial assessments of psychiatric condition, potential administration of medications if indicated, further disposition pending the patient's psychiatric course during the monitoring period.   -----             Medications   hydrOXYzine HCl (ATARAX) tablet 25-50 mg (50 mg Oral $Given 6/21/25 2017)   acetaminophen (TYLENOL) tablet 650 mg (has no administration in time range)   OLANZapine zydis  (zyPREXA) ODT tab 10 mg (has no administration in time range)   melatonin tablet 3 mg (has no administration in time range)   escitalopram (LEXAPRO) tablet 20 mg (has no administration in time range)          Critical care was not performed.     Medical Decision Making  The patient's presentation was of high complexity (a chronic illness severe exacerbation, progression, or side effect of treatment).    The patient's evaluation involved:  review of external note(s) from 3+ sources (ED notes, telephone encounters, progress notes, discharge summary)  review of 3+ test result(s) ordered prior to this encounter (cbc, bmp, etoh, UDS)  strong consideration of a test (see separate area of note for details) that was ultimately deferred  discussion of management or test interpretation with another health professional (see separate area of note for details)    The patient's management necessitated high risk (a decision regarding hospitalization).    Assessment & Plan    This is a 39-year-old male with history of alcohol abuse, anxiety, depression, schizophrenia presenting with concern for anxiety.  The patient has been increasingly anxious related to having lost housing, not been able to take his medications.  He called EMS today to bring him to the ED.    Here he was well-appearing and had reassuring vitals.  Did consider doing some blood work to assess for metabolic arrangement as patient reported feeling dehydrated however he did not agree to blood work, given he overall appears well and clinically appears euvolemic this was deferred.    DEC did see the patient and recommended observation to restart meds and psychiatary consult.  Patient was agreeable to this plan.    I have reviewed the nursing notes. I have reviewed the findings, diagnosis, plan and need for follow up with the patient.    New Prescriptions    No medications on file       Final diagnoses:   Anxiety       Esequiel Bills  [facility] EMERGENCY  DEPARTMENT  6/21/2025     Esequiel Bills MD  06/21/25 [id number]
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
## 2025-06-22 NOTE — PHARMACY-ADMISSION MEDICATION HISTORY
Admission Medication History Completed by Pharmacy    See [facility] Admission Navigator for allergy information, preferred outpatient pharmacy and prior to admission medications.     Medication History Sources:   Prescription fill history via Epic Surescripts report  Patient interview in [facility]     Pertinent Information or Changes Made to PTA Med List:  Patient states lexapro dose was increased from 15mg --> 20mg this past week   Uses hydroxyzine daily prn anxiety  Patient denies taking any OTC supplements     Allergies reviewed with patient: no     Prior to Admission medications    Medication Sig Last Dose       escitalopram (LEXAPRO) 20 MG tablet     Take 20 mg by mouth daily. 6/21/2025       hydrOXYzine HCl (ATARAX) 25 MG tablet Take 50 mg by mouth every 6 hours as needed for anxiety.     6/21/2025         Date completed: 06/22/25    Medication history completed by:   Aubrie White, Pharm.D., [facility]  Behavioral Health Inpatient Pharmacist  [facility] Emergency Department  Contact via JournalDoc or Epic Messaging

## 2025-06-22 NOTE — ED NOTES
Patient compliant with medication administration, requesting to complete vitals and assessment later wants to sleep.  Re approached 1.5 hours later and patient requesting to return again later.  Has not eaten breakfast at this time.  Completed lunch menu.

## 2025-06-22 NOTE — PROGRESS NOTES
"Triage & Transition Services, Extended Care       Patient: Brodie goes by \"Brodie,\" uses he/him pronouns  Date of Service: June 22, 2025  Site of Service: [facility] Emergency Department                             [id number]  Patient was seen no Virtual: iPad  Mode of Assessment:      Patient is followed related to: boarding status  Notable observations from today's encounter include: Extended Care clinician attempted to meet with patient today but he was sleeping and did not engage in session. Eventually he was seen by China Alegre and she indicates that he denies SI/HI. AVH, paranoia. He is depressed and anxious because of his conflict with his parents but no reason to hospitalize. He just wants meds, and said he is due for a refill, and he would appreciate it if we helped him get to [place] by 8 am tomorrow.    The care team is working towards the following: Learn and Demonstrate at [employer] Focused on Crisis Stabilization  Significant status changes: no  Case Management included:      Recommendations: Final Disposition / Recommended Care Path: Discharge    Plan for Care reviewed with assigned Medical Provider: yes    Plan for Care Team Review: provider  Comments: Consulted with Dr. Quick and China Alegre regarding disposition and planning.  Patient and/or validated legal guardian concurs: yes    Clinical Substantiation: EC clinician was unable to see patient due to sleeping. He was seen by China Alegre and she reports, he denies SI/HI. AVH, paranoia. He is depressed and anxious because of his conflict with his parents but no reason to hospitalize. He just wants meds, and said he is due for a refill, and he would appreciate it if we helped him get to [place] by 8 am tomorrow.    Summary: EC clinician was unable to see patient due to sleeping. He was seen by China Alegre and she reports, he denies SI/HI. AVH, paranoia. He is depressed and anxious because of " his conflict with his parents but no reason to hospitalize. He just wants meds, and said he is due for a refill, and he would appreciate it if we helped him get to [place] court by 8 am tomorrow.    Legal Status: County: [facility]  Legal Status: Voluntary/Patient has signed consent for treatment    Mo Robbins, [facility]   Licensed Mental Health Professional (LMHP), [facility]  [phone]

## 2025-06-22 NOTE — PROGRESS NOTES
Patient remains on Obs tonight and requests a cab in the morning to attend [facility] [address] in [place] at 8 am.   [facility], [address] [place] [address], [place] [address]

## 2025-06-23 PROCEDURE — G0378 HOSPITAL OBSERVATION PER HR: HCPCS

## 2025-06-23 PROCEDURE — 250N000013 HC RX MED GY IP 250 OP 250 PS 637: Performed by: EMERGENCY MEDICINE

## 2025-06-23 RX ADMIN — ESCITALOPRAM 20 MG: 20 TABLET, FILM COATED ORAL at 06:20

## 2025-06-23 RX ADMIN — ACETAMINOPHEN 650 MG: 325 TABLET ORAL at 06:54

## 2025-06-23 ASSESSMENT — ACTIVITIES OF DAILY LIVING (ADL)
ADLS_ACUITY_SCORE: 41

## 2025-06-23 ASSESSMENT — ENCOUNTER SYMPTOMS: NERVOUS/ANXIOUS: 1

## 2025-06-23 NOTE — DISCHARGE SUMMARY
ED Observation Discharge Summary  [facility]  Discharge Date: 6/23/2025    Brodie Carnes MRN: [id number]   Age: 39 year old YOB: 1985     Brief HPI & Initial ED Course     Chief Complaint   Patient presents with    Anxiety       Anxiety      Brodie Carnes is a 39 year old male with PMH notable for alcohol withdrawal syndrome, anxiety, depression, suicidal ideation, schizophrenia who presented to the ED with a psychiatric concern.  He was admitted observation and transferred to the behavior evaluation center.      The patient was evaluated in the emergency department by a physician and DEC behavioral health . The DEC  recommended observation. See separate DEC note from this encounter for details on the assessment. The patient's psychiatric state was such that he would benefit from ongoing monitoring. Observation care was initiated with the plan including serial assessments of psychiatric condition, potential administration of medications if indicated, and further disposition pending the patient's psychiatric course during the monitoring period.     See ED Observation H&P for further details on the patient's presenting history and initial evaluation.     Physical Exam   BP: (!) 144/97  Pulse: 81  Temp: 97.7  F (36.5  C)  Resp: 18  SpO2: 98 %    Physical Exam  General: . Appears stated age. RC, NAD  Resp: Normal work of breathing, normal respiratory rate  Neuro: alert and alert      Results      Procedures                  Labs Ordered and Resulted from Time of ED Arrival to Time of ED Departure - No data to display         Observation Course   The patient was found to have a psychiatric condition that would benefit from an observation stay in the emergency department for further psychiatric stabilization and/or coordination of a safe disposition. The plan upon observation admission included serial assessments of psychiatric condition, potential  administration of medications if indicated, further disposition pending the patient's psychiatric course during the monitoring period.     Serial assessments of the patient's psychiatric condition were performed. Nursing notes were reviewed. During the observation period, the patient did not require medications for agitation, and did not require restraints/seclusion for patient and/or provider safety.        After the period in observation care, the patient's circumstances and mental state were safe for outpatient management.  He was seen yesterday evening by psychiatry who recommended discharge.  Plan is to discharge this morning to court without additional reassessment required.      After counseling on the diagnosis, work-up, and treatment plan, the patient was discharged. Close follow-up with a psychiatrist and/or therapist was recommended and community psychiatric resources were provided. Patient is to return to the ED if any urgent or potentially life-threatening concerns.      Discharge Diagnoses:   Final diagnoses:   Anxiety       --  Neil Colorado Jr., MD   [facility] EMERGENCY DEPARTMENT  6/23/2025

## 2025-06-23 NOTE — ED NOTES
"Pt received PRN Hydroxyzine earlier for anxiety with no relief. Pt still endorsing \"high anxiety\". Triggers - unknown. He received PRN Zyprexa 10 mg per request. Pt currently resting in bed. Plan of care maintained.   "

## 2025-06-23 NOTE — ED NOTES
Call to Dr. Bonilla for discharge instructions and authorization,  Decision made to bring patient over to ED and [facility].

## 2025-06-23 NOTE — ED NOTES
Plan is for pt to discharge to [facility] ([address] [place] [address]). [facility] staff to arrange taxi transportation for pt. as early as 7am so he can make it to his court case by 8am.   Per GORDON Atkinson, pt will not need a reassessment.

## 2025-06-23 NOTE — ED NOTES
[employer] ordered for ride in am to [facility] for court appearance at 8 am .   time is 7 am  the  reference number is [id number] [facility] [place][note 2]2 [address] to [place].

## 2025-06-23 NOTE — ED NOTES
Patient discharged from ED, with belongings, Patient placed in cab to go to [facility] ([address] [place] [address])

## 2025-06-24 ENCOUNTER — TELEPHONE (OUTPATIENT)
Dept: BEHAVIORAL HEALTH | Facility: CLINIC | Age: 40
End: 2025-06-24
Payer: MEDICAID

## 2025-06-24 NOTE — TELEPHONE ENCOUNTER
Writer attempted to speak with pt re: DEC Assessment. Person who answered call ended call abruptly. Unable to LVM, no other #s in chart.

## 2025-06-25 ENCOUNTER — PATIENT OUTREACH (OUTPATIENT)
Dept: CARE COORDINATION | Facility: CLINIC | Age: 40
End: 2025-06-25
Payer: MEDICAID

## 2025-06-25 NOTE — PROGRESS NOTES
Connected Care Resource Center:   [facility] Resource Center Contact  [facility]/Voicemail     Clinical Data: Post-Discharge Outreach     Outreach attempted x 2.  Left message on patient's voicemail, providing [facility]'s central phone number of [phone] ([phone]) for questions/concerns and/or to schedule an appt with an [facility] provider, if they do not have a PCP.      Plan:  [facility] will do no further outreaches at this time.       BRANDEN Gonzalez  Connected Care Resource [place], [facility]    *Connected Care Resource Team does NOT follow patient ongoing. Referrals are identified based on internal discharge reports and the outreach is to ensure patient has an understanding of their discharge instructions.